# Patient Record
Sex: FEMALE | Race: WHITE
[De-identification: names, ages, dates, MRNs, and addresses within clinical notes are randomized per-mention and may not be internally consistent; named-entity substitution may affect disease eponyms.]

---

## 2017-04-12 ENCOUNTER — HOSPITAL ENCOUNTER (INPATIENT)
Dept: HOSPITAL 25 - ED | Age: 62
LOS: 3 days | Discharge: HOME HEALTH | DRG: 309 | End: 2017-04-15
Attending: INTERNAL MEDICINE | Admitting: HOSPITALIST
Payer: MEDICARE

## 2017-04-12 DIAGNOSIS — I24.8: ICD-10-CM

## 2017-04-12 DIAGNOSIS — F10.21: ICD-10-CM

## 2017-04-12 DIAGNOSIS — Z79.84: ICD-10-CM

## 2017-04-12 DIAGNOSIS — F41.9: ICD-10-CM

## 2017-04-12 DIAGNOSIS — Z88.8: ICD-10-CM

## 2017-04-12 DIAGNOSIS — E11.9: ICD-10-CM

## 2017-04-12 DIAGNOSIS — F40.01: ICD-10-CM

## 2017-04-12 DIAGNOSIS — E78.5: ICD-10-CM

## 2017-04-12 DIAGNOSIS — Z87.891: ICD-10-CM

## 2017-04-12 DIAGNOSIS — F31.9: ICD-10-CM

## 2017-04-12 DIAGNOSIS — K80.20: ICD-10-CM

## 2017-04-12 DIAGNOSIS — Z79.01: ICD-10-CM

## 2017-04-12 DIAGNOSIS — G89.4: ICD-10-CM

## 2017-04-12 DIAGNOSIS — Z82.49: ICD-10-CM

## 2017-04-12 DIAGNOSIS — I10: ICD-10-CM

## 2017-04-12 DIAGNOSIS — R07.9: ICD-10-CM

## 2017-04-12 DIAGNOSIS — I48.91: Primary | ICD-10-CM

## 2017-04-12 DIAGNOSIS — Z90.710: ICD-10-CM

## 2017-04-12 DIAGNOSIS — Z79.82: ICD-10-CM

## 2017-04-12 DIAGNOSIS — Z83.3: ICD-10-CM

## 2017-04-12 DIAGNOSIS — Z88.1: ICD-10-CM

## 2017-04-12 DIAGNOSIS — Z23: ICD-10-CM

## 2017-04-12 DIAGNOSIS — I25.10: ICD-10-CM

## 2017-04-12 DIAGNOSIS — Z81.8: ICD-10-CM

## 2017-04-12 DIAGNOSIS — Z95.5: ICD-10-CM

## 2017-04-12 DIAGNOSIS — E66.3: ICD-10-CM

## 2017-04-12 DIAGNOSIS — Z81.1: ICD-10-CM

## 2017-04-12 LAB
ALBUMIN SERPL BCG-MCNC: 4.2 G/DL (ref 3.2–5.2)
ALP SERPL-CCNC: 67 U/L (ref 34–104)
ALT SERPL W P-5'-P-CCNC: 14 U/L (ref 7–52)
ANION GAP SERPL CALC-SCNC: 9 MMOL/L (ref 2–11)
AST SERPL-CCNC: 19 U/L (ref 13–39)
BUN SERPL-MCNC: 10 MG/DL (ref 6–24)
BUN/CREAT SERPL: 11.6 (ref 8–20)
CALCIUM SERPL-MCNC: 9.3 MG/DL (ref 8.6–10.3)
CHLORIDE SERPL-SCNC: 105 MMOL/L (ref 101–111)
GLOBULIN SER CALC-MCNC: 2.9 G/DL (ref 2–4)
GLUCOSE SERPL-MCNC: 142 MG/DL (ref 70–100)
HCO3 SERPL-SCNC: 22 MMOL/L (ref 22–32)
HCT VFR BLD AUTO: 43 % (ref 35–47)
HGB BLD-MCNC: 14.7 G/DL (ref 12–16)
MAGNESIUM SERPL-MCNC: 2.1 MG/DL (ref 1.9–2.7)
MCH RBC QN AUTO: 28 PG (ref 27–31)
MCHC RBC AUTO-ENTMCNC: 34 G/DL (ref 31–36)
MCV RBC AUTO: 82 FL (ref 80–97)
POTASSIUM SERPL-SCNC: 3.9 MMOL/L (ref 3.5–5)
PROT SERPL-MCNC: 7.1 G/DL (ref 6.4–8.9)
RBC # BLD AUTO: 5.3 10^6/UL (ref 4–5.4)
SODIUM SERPL-SCNC: 136 MMOL/L (ref 133–145)
TROPONIN I SERPL-MCNC: 0.04 NG/ML (ref ?–0.04)
TSH SERPL-ACNC: 1.09 MCIU/ML (ref 0.34–5.6)
WBC # BLD AUTO: 10 10^3/UL (ref 3.5–10.8)

## 2017-04-12 PROCEDURE — 93005 ELECTROCARDIOGRAM TRACING: CPT

## 2017-04-12 PROCEDURE — 90686 IIV4 VACC NO PRSV 0.5 ML IM: CPT

## 2017-04-12 PROCEDURE — 80061 LIPID PANEL: CPT

## 2017-04-12 PROCEDURE — 80053 COMPREHEN METABOLIC PANEL: CPT

## 2017-04-12 PROCEDURE — 85730 THROMBOPLASTIN TIME PARTIAL: CPT

## 2017-04-12 PROCEDURE — 84484 ASSAY OF TROPONIN QUANT: CPT

## 2017-04-12 PROCEDURE — 85610 PROTHROMBIN TIME: CPT

## 2017-04-12 PROCEDURE — A9502 TC99M TETROFOSMIN: HCPCS

## 2017-04-12 PROCEDURE — 84443 ASSAY THYROID STIM HORMONE: CPT

## 2017-04-12 PROCEDURE — 85025 COMPLETE CBC W/AUTO DIFF WBC: CPT

## 2017-04-12 PROCEDURE — 71275 CT ANGIOGRAPHY CHEST: CPT

## 2017-04-12 PROCEDURE — 93306 TTE W/DOPPLER COMPLETE: CPT

## 2017-04-12 PROCEDURE — 78452 HT MUSCLE IMAGE SPECT MULT: CPT

## 2017-04-12 PROCEDURE — 36415 COLL VENOUS BLD VENIPUNCTURE: CPT

## 2017-04-12 PROCEDURE — 83036 HEMOGLOBIN GLYCOSYLATED A1C: CPT

## 2017-04-12 PROCEDURE — 71020: CPT

## 2017-04-12 PROCEDURE — 83735 ASSAY OF MAGNESIUM: CPT

## 2017-04-12 PROCEDURE — 93017 CV STRESS TEST TRACING ONLY: CPT

## 2017-04-12 PROCEDURE — 85379 FIBRIN DEGRADATION QUANT: CPT

## 2017-04-12 NOTE — RAD
Indication: Chest pain.



2 views of the chest including dual energy PA views demonstrate no mediastinal shift.

Heart is of normal size and configuration. When compared to previous exam of October 17, 2012 no significant change is noted.



IMPRESSION: No active cardiopulmonary disease is noted.

## 2017-04-12 NOTE — ED
rosemarie MENA Timothy, scribed for Niko Agrawal MD on 04/12/17 at 1936 .





Palpitations / Dysrhythmia





- HPI Summary


HPI Summary: 





Renata Bran is a 63 yo female presenting to [facility] with sudden-onset 

palpitations of 160-180 BPM, which were narrow and regular per EMS, and chest 

tightness since 1600 today. She was sitting in a chair when she noticed her Sx. 

She states her Sx were similar to 11/2012 when she had a coronary stent placed. 

She has a Hx of similar Sx, but states tonight they lasted much longer than 

normal. She states she is not taking any of her medications right now due to 

hermental health problems, and has not received her Rx's. Her MHx includes CAD, 

coronary stent 11/2012, HTN, DM, bipolar disorder, panic disorder, depression, 

anxiety, and substance abuse.








- History of Current Complaint


Time Seen by Provider: 04/12/17 19:19


Onset/Duration: Sudden Onset, Lasting Hours, Still Present


Timing: Constant


Severity Initially: Moderate


Severity Currently: Moderate


Character: Fast, Irregular


Aggravating: Rest


Associated Signs & Symptoms: Chest Pain - tightness





- Allergy/Home Medications


Allergies/Adverse Reactions: 


 Allergies











Allergy/AdvReac Type Severity Reaction Status Date / Time


 


Erythromycin Allergy Intermediate Nausea And Verified 03/30/15 13:46





   Vomiting  


 


Gabapentin Allergy Unknown Unknown Verified 04/12/17 19:54





   Reaction  





   Details  











Home Medications: 


 Home Medications





Hydroxyzine Pamoate 50 mg PO BID PRN 04/12/17 [History Confirmed 04/12/17]











PMH/Surg Hx/FS Hx/Imm Hx


Endocrine/Hematology History: Reports: Hx Diabetes


   Denies: Hx Anticoagulant Therapy, Hx Thyroid Disease


Cardiovascular History: Reports: Hx Hypertension - W/MEDS, Other Cardiovascular 

Problems/Disorders - PT.STATES HEART MURMUR BICUSPID CONDITION


   Denies: Hx Pacemaker/ICD


Respiratory History: 


   Denies: Hx Asthma, Hx Chronic Obstructive Pulmonary Disease (COPD)


 History: 


   Denies: Hx Renal Disease


Neurological History: 


   Denies: Hx Dementia, Hx Seizures


Psychiatric History: Reports: Hx Anxiety, Hx Depression, Hx Panic Disorder - BI 

POLAR, Hx Bipolar Disorder - hospitalized in 3/2010, Hx Substance Abuse


   Denies: Hx Eating Disorder





- Surgical History


Surgery Procedure, Year, and Place: CESARIAN 1981,1983; HYSTERECTOMY 2008; 

STENT LAST NOVEMBER,[facility]


Infectious Disease History: 


   Denies: Hx Hepatitis, Hx Human Immunodeficiency Virus (HIV), History Other 

Infectious Disease, Traveled Outside the [place] in Last 30 Days





- Family History


Known Family History: Positive: Other - depression, alcohol abuse





- Social History


Alcohol Use: None


Substance Use Type: Reports: None


Smoking Status (MU): Former Smoker





Review of Systems


Constitutional: Negative


Eyes: Negative


ENT: Negative


Positive: Palpitations, Chest Pain


Respiratory: Negative


Gastrointestinal: Negative


Genitourinary: Negative


Musculoskeletal: Negative


Skin: Negative


Neurological: Negative


Psychological: Normal


All Other Systems Reviewed And Are Negative: Yes





Physical Exam


Triage Information Reviewed: Yes


Vital Signs On Initial Exam: 


 Initial Vitals











Temp Pulse Resp BP Pulse Ox


 


 97.0 F   145   16   156/116   96 


 


 04/12/17 19:39  04/12/17 19:39  04/12/17 19:39  04/12/17 19:39  04/12/17 19:39











Vital Signs Reviewed: Yes


Appearance: Positive: No Pain Distress, Thin


Skin: Positive: Warm


Head/Face: Positive: Normal Head/Face Inspection


Eyes: Positive: GENE


ENT: Positive: Hearing grossly normal


Neck: Positive: Supple


Respiratory/Lung Sounds: Positive: Clear to Auscultation, Breath Sounds Present


Cardiovascular: Positive: IRR, Tachycardia


Abdomen Description: Positive: Nontender, Soft


Bowel Sounds: Positive: Present


Musculoskeletal: Positive: Strength/ROM Intact


Neurological: Positive: Sensory/Motor Intact, Alert, Oriented to Person Place, 

Time


Psychiatric: Positive: Anxious





Diagnostics





- Vital Signs


 Vital Signs











  Temp Pulse Resp BP Pulse Ox


 


 04/12/17 19:39  97.0 F  145  16  156/116  96














- Laboratory


Lab Results: 


 Lab Results











  04/12/17 04/12/17 04/12/17 Range/Units





  20:10 20:10 20:10 


 


WBC  10.0    (3.5-10.8)  10^3/ul


 


RBC  5.30    (4.0-5.4)  10^6/ul


 


Hgb  14.7    (12.0-16.0)  g/dl


 


Hct  43    (35-47)  %


 


MCV  82    (80-97)  fL


 


MCH  28    (27-31)  pg


 


MCHC  34    (31-36)  g/dl


 


RDW  13    (10.5-15)  %


 


Plt Count  183    (150-450)  10^3/ul


 


MPV  9    (7.4-10.4)  um3


 


Neut % (Auto)  64.5    (38-83)  %


 


Lymph % (Auto)  26.7    (25-47)  %


 


Mono % (Auto)  6.4    (1-9)  %


 


Eos % (Auto)  1.5    (0-6)  %


 


Baso % (Auto)  0.9    (0-2)  %


 


Absolute Neuts (auto)  6.5    (1.5-7.7)  10^3/ul


 


Absolute Lymphs (auto)  2.7    (1.0-4.8)  10^3/ul


 


Absolute Monos (auto)  0.6    (0-0.8)  10^3/ul


 


Absolute Eos (auto)  0.1    (0-0.6)  10^3/ul


 


Absolute Basos (auto)  0.1    (0-0.2)  10^3/ul


 


Absolute Nucleated RBC  0.02    10^3/ul


 


Nucleated RBC %  0.2    


 


INR (Anticoag Therapy)    0.78 L  (0.89-1.11)  


 


D-Dimer, Quantitative    260 H  (Less Than 230)  ng/mL


 


Sodium   136   (133-145)  mmol/L


 


Potassium   3.9   (3.5-5.0)  mmol/L


 


Chloride   105   (101-111)  mmol/L


 


Carbon Dioxide   22   (22-32)  mmol/L


 


Anion Gap   9   (2-11)  mmol/L


 


BUN   10   (6-24)  mg/dL


 


Creatinine   0.86   (0.51-0.95)  mg/dL


 


Est GFR ( Amer)   85.4   (>60)  


 


Est GFR (Non-Af Amer)   66.4   (>60)  


 


BUN/Creatinine Ratio   11.6   (8-20)  


 


Glucose   142 H   ()  mg/dL


 


Calcium   9.3   (8.6-10.3)  mg/dL


 


Magnesium   2.1   (1.9-2.7)  mg/dL


 


Total Bilirubin   0.40   (0.2-1.0)  mg/dL


 


AST   19   (13-39)  U/L


 


ALT   14   (7-52)  U/L


 


Alkaline Phosphatase   67   ()  U/L


 


Troponin I   0.04 H*   (<0.04)  ng/mL


 


Total Protein   7.1   (6.4-8.9)  g/dL


 


Albumin   4.2   (3.2-5.2)  g/dL


 


Globulin   2.9   (2-4)  g/dL


 


Albumin/Globulin Ratio   1.4   (1-3)  


 


TSH   1.09   (0.34-5.60)  mcIU/mL











Result Diagrams: 


 04/13/17 04:57





 04/12/17 20:10


Lab Statement: Any lab studies that have been ordered have been reviewed, and 

results considered in the medical decision making process.





- Radiology


  ** CXR


Xray Interpretation: No Acute Changes - IMPRESSION: No active cardiopulmonary 

disease is noted.


Radiology Interpretation Completed By: Radiologist





- CT


  ** CTA Chest


CT Interpretation: Positive (See Comments) - IMPRESSION: NO EVIDENCE OF 

PULMONARY EMBOLUS IS NOTED.  CHOLELITHIASIS WITHOUT BILIARY DUCT DILATATION.  

SMALL MEDIASTINAL LYMPH NODES AND RIGHT HILAR LYMPH NODES MEASURING UP TO 10 MM.


CT Interpretation Completed By: Radiologist





- EKG


  ** 1932


Cardiac Rate: Tachycardia - 133 BPM


EKG Interpretation: Afib @ 133 BPM, rapid ventricular response.





  ** 1958


Cardiac Rate: NL - 74 BPM


EKG Interpretation: Pt converted to NSR @ 74 BPM, normal EKG





Re-Evaluation





- Re-Evaluation


  ** First Eval


Re-Evaluation Time: 21:05


Change: Improved


Comment: Pt had questions regarding the course of Tx, she had her questions 

satisfactorily answered.





Course/Dx





- Course


Assessment/Plan: Renata Bran is a 63 yo female [facility] 

palpitations and chest tightness since 1600 today. She states she has not been 

taking any of her medications due to her mental health disorders. In the ED she 

was given Diltiazem IV to slow her pulse. Her first EKG displayed Afib at 133 

BPM with rapid ventricular response. Her second EKG showed a conversion to NSR 

at 74 BPM. Her CXR suggested no acute disease. Her CTA chest suggested no 

evidence of pulmonaru embolus; cholelithiasis without biliary dilation; and 

small mediastinal lymph nodes and right hilar lymph nodes measuring up to 10 

mm. After clinical examination and review of her lab and imaging studies, 

notably her troponin of 0.04 and her D-dimer of 260, as well as discussion with 

Dr. Frankenberg, she will be admitted to [facility] for further evaluation and 

treatment of new onset Afib and ACS.





- Diagnoses


Provider Diagnoses: 


 New onset a-fib, Acute coronary syndrome








- Physician Notifications


Discussed Care Of Patient With: 2227 - Dr. Frankenberg (hospitalist) - 

discussed Pt condition, recommends admission


Instructed by Provider To: Admit As Inpatient





- Critical Care Time


Critical Care Time: 30-74 min





Discharge





- Discharge Plan


Condition: Stable


Disposition: ADMITTED TO [facility]


Discharge Disposition Comment: Admitted for evaluation and treament of new 

onset Afib and ACS





The documentation as recorded by the rosemarie dumont Timothy accurately 

reflects the service I personally performed and the decisions made by me, 

Niko Agrawal MD.

## 2017-04-12 NOTE — RAD
Indication: Elevated d-dimer, atrial fibrillation.



Contrast: Administered 137.7 ml of VISAPAQUE 320 mgi/ml



CTA of the chest was performed after IV contrast administration. Coronal and sagittal

reconstructed images were obtained.



The pulmonary arterial tree is well opacified. There are no filling defects present to

suggest pulmonary embolus.



No evidence of aortic dissection is noted. Atherosclerotic aorta is noted.



Pretracheal lymph node is noted measuring up to 10 mm. Right hilar adenopathy measuring up

to 10 mm is noted. Heart demonstrates no pericardial effusion.



The trachea and major bronchi appear patent. No alveolar consolidation is noted. There is

poor inspiratory effort noted. No focal nodules identified. No pleural fluid is

identified.



The visualized abdominal organs are unremarkable.



Gallbladder demonstrates cholelithiasis.



IMPRESSION: NO EVIDENCE OF PULMONARY EMBOLUS IS NOTED.



CHOLELITHIASIS WITHOUT BILIARY DUCT DILATATION.



SMALL MEDIASTINAL LYMPH NODES AND RIGHT HILAR LYMPH NODES MEASURING UP TO 10 MM.

## 2017-04-13 LAB
CHOLEST SERPL-MCNC: 218 MG/DL
HCT VFR BLD AUTO: 39 % (ref 35–47)
HDLC SERPL-MCNC: 58.8 MG/DL
HGB BLD-MCNC: 13.4 G/DL (ref 12–16)
MCH RBC QN AUTO: 29 PG (ref 27–31)
MCHC RBC AUTO-ENTMCNC: 35 G/DL (ref 31–36)
MCV RBC AUTO: 82 FL (ref 80–97)
RBC # BLD AUTO: 4.71 10^6/UL (ref 4–5.4)
TRIGL SERPL-MCNC: 146 MG/DL
TROPONIN I SERPL-MCNC: 0.09 NG/ML (ref ?–0.04)
WBC # BLD AUTO: 9.7 10^3/UL (ref 3.5–10.8)

## 2017-04-13 PROCEDURE — 3E0234Z INTRODUCTION OF SERUM, TOXOID AND VACCINE INTO MUSCLE, PERCUTANEOUS APPROACH: ICD-10-PCS | Performed by: INTERNAL MEDICINE

## 2017-04-13 RX ADMIN — OMEPRAZOLE SCH MG: 20 CAPSULE, DELAYED RELEASE ORAL at 05:09

## 2017-04-13 RX ADMIN — INSULIN LISPRO SCH UNITS: 100 INJECTION, SOLUTION INTRAVENOUS; SUBCUTANEOUS at 20:54

## 2017-04-13 RX ADMIN — APIXABAN SCH MG: 5 TABLET, FILM COATED ORAL at 17:33

## 2017-04-13 RX ADMIN — INSULIN LISPRO SCH: 100 INJECTION, SOLUTION INTRAVENOUS; SUBCUTANEOUS at 08:47

## 2017-04-13 RX ADMIN — INSULIN LISPRO SCH: 100 INJECTION, SOLUTION INTRAVENOUS; SUBCUTANEOUS at 16:34

## 2017-04-13 RX ADMIN — INSULIN LISPRO SCH: 100 INJECTION, SOLUTION INTRAVENOUS; SUBCUTANEOUS at 13:10

## 2017-04-13 RX ADMIN — METOPROLOL TARTRATE SCH MG: 25 TABLET, FILM COATED ORAL at 09:22

## 2017-04-13 RX ADMIN — LAMOTRIGINE SCH MG: 100 TABLET ORAL at 09:22

## 2017-04-13 RX ADMIN — METFORMIN HYDROCHLORIDE SCH MG: 500 TABLET, FILM COATED ORAL at 20:19

## 2017-04-13 RX ADMIN — TRAZODONE HYDROCHLORIDE PRN MG: 50 TABLET ORAL at 02:58

## 2017-04-13 RX ADMIN — LAMOTRIGINE SCH MG: 100 TABLET ORAL at 20:20

## 2017-04-13 RX ADMIN — METFORMIN HYDROCHLORIDE SCH MG: 500 TABLET, FILM COATED ORAL at 09:22

## 2017-04-13 RX ADMIN — METOPROLOL TARTRATE SCH MG: 25 TABLET, FILM COATED ORAL at 20:20

## 2017-04-13 RX ADMIN — ASPIRIN 325 MG ORAL TABLET SCH MG: 325 PILL ORAL at 09:22

## 2017-04-13 RX ADMIN — AMLODIPINE BESYLATE SCH MG: 5 TABLET ORAL at 16:32

## 2017-04-13 NOTE — HP
H&P (Free Text)


History and Physical: 





PCP: ALHAJI Whitman MD





Date/Time of Evaluation: 04/12/2017 2230





CC: palpitations





HPI: Mrs Bran is a 61YO female presenting with sudden onset ~1600 of 

palpitations & mild substernal chest pressure radiating into her L shoulder and 

L neck. She denies SOB, N/V, light-headedness, & sweats. She has had similar 

episodes in the past lasting only a few seconds, but this one didn't stop 

eventually prompting her to present for evaluation at 1925. 





Work up reveals a ECG with AFIB rate 133, mild ST depressions in V4-6, I, & II. 

D-dimer is 240, troponin 0.04. CTA chest negative for PE, positive small 

lymphadenopathy. She was given 20mg IV diltiazem precipitating conversion to 

NSR. Request for observation made based on chest pressure & indeterminate 

troponin.





PMedHx


Allergies


Erythromycin Allergy (Intermediate, Verified 03/30/15 13:46)


 Nausea And Vomiting


Gabapentin Allergy (Unknown, Verified 04/12/17 19:54)


 Unknown Reaction Details





CAD/stent


DM2


HTN


HLD


bipolar


anxiety


depression





Ambulatory Orders


Nursing to reconcile.





Aspirin TAB* [Aspirin 325 MG TAB*] 325 mg PO DAILY 10/17/12 


Hydrochlorothiazide TAB* [Hydrodiuril TAB*] 25 mg PO DAILY 10/17/12 


Multiple Vitamin [Multivitamins] 1 tab PO DAILY 10/17/12 


lamoTRIgine TAB(*) [Lamictal TAB(*)] 200 mg PO BID 10/17/12 


metFORMIN* [Glucophage 500 MG TAB *] 1,000 mg PO BID 10/17/12 


traZODone TAB* [Desyrel*] 50 mg PO BEDTIME PRN 10/17/12 


Hydroxyzine Pamoate 50 mg PO BID PRN 04/12/17 





SocHx: former smoker quit in 1994 w/ >40PYHX (2-3PPD [id number]), former drinker 

quit in 2004, denies active recreational drug use; ; on disability, 

formerly worked as an administrative assistance at [employer]; full code status





FamHx: positive for DM2, HTN, HLD





ROS: as above, otherwise reviewed and all were negative





Constitutional: NAD, normally developed, overweight white female


 


vitals: 


 Vital Signs











Temp  36.1 C   04/12/17 19:39


 


Pulse  64   04/12/17 22:09


 


Resp  8   04/12/17 22:09


 


BP  169/88   04/12/17 21:32


 


Pulse Ox  97   04/12/17 22:09








 Intake & Output











 04/12/17 04/12/17 04/13/17





 11:59 23:59 11:59


 


Weight  68.039 kg 








HEENM: atraumatic; sclera/conjunctiva: non-icteric/clear; hearing: clinically 

intact; oropharynx: clear, mucosa moist





Neck: soft tissue: non-tender; thyroid: normal





Pulmonary: clear to auscultation bilaterally, good aeration, no accessory 

muscle use





CV: RR/RR, normal S1S2, no carotid bruit, no jugular venous distention, 2+ B DP/

PT, no edema





Abdominal: soft, non-distended, non-tender, no rebound/guarding/rigidity, 

normoactive bowel sounds, no hepatosplenomegaly or masses, no costovertebral 

angle tenderness





Musculoskeletal: general: grossly intact; gait: stable





Integumental: normal appearance and texture





Psychiatric 


orientation: AA&O to PPS


affect: calm


mood: cooperative


eye contact: good


content: reliable


responses: timely


insight: fair to good





Testing: 


 Lab Results











  04/12/17 04/12/17 04/12/17 Range/Units





  20:10 20:10 20:10 


 


WBC  10.0    (3.5-10.8)  10^3/ul


 


RBC  5.30    (4.0-5.4)  10^6/ul


 


Hgb  14.7    (12.0-16.0)  g/dl


 


Hct  43    (35-47)  %


 


MCV  82    (80-97)  fL


 


MCH  28    (27-31)  pg


 


MCHC  34    (31-36)  g/dl


 


RDW  13    (10.5-15)  %


 


Plt Count  183    (150-450)  10^3/ul


 


MPV  9    (7.4-10.4)  um3


 


Neut % (Auto)  64.5    (38-83)  %


 


Lymph % (Auto)  26.7    (25-47)  %


 


Mono % (Auto)  6.4    (1-9)  %


 


Eos % (Auto)  1.5    (0-6)  %


 


Baso % (Auto)  0.9    (0-2)  %


 


Absolute Neuts (auto)  6.5    (1.5-7.7)  10^3/ul


 


Absolute Lymphs (auto)  2.7    (1.0-4.8)  10^3/ul


 


Absolute Monos (auto)  0.6    (0-0.8)  10^3/ul


 


Absolute Eos (auto)  0.1    (0-0.6)  10^3/ul


 


Absolute Basos (auto)  0.1    (0-0.2)  10^3/ul


 


Absolute Nucleated RBC  0.02    10^3/ul


 


Nucleated RBC %  0.2    


 


INR (Anticoag Therapy)    0.78 L  (0.89-1.11)  


 


D-Dimer, Quantitative    260 H  (Less Than 230)  ng/mL


 


Sodium   136   (133-145)  mmol/L


 


Potassium   3.9   (3.5-5.0)  mmol/L


 


Chloride   105   (101-111)  mmol/L


 


Carbon Dioxide   22   (22-32)  mmol/L


 


Anion Gap   9   (2-11)  mmol/L


 


BUN   10   (6-24)  mg/dL


 


Creatinine   0.86   (0.51-0.95)  mg/dL


 


Est GFR ( Amer)   85.4   (>60)  


 


Est GFR (Non-Af Amer)   66.4   (>60)  


 


BUN/Creatinine Ratio   11.6   (8-20)  


 


Glucose   142 H   ()  mg/dL


 


Calcium   9.3   (8.6-10.3)  mg/dL


 


Magnesium   2.1   (1.9-2.7)  mg/dL


 


Total Bilirubin   0.40   (0.2-1.0)  mg/dL


 


AST   19   (13-39)  U/L


 


ALT   14   (7-52)  U/L


 


Alkaline Phosphatase   67   ()  U/L


 


Troponin I   0.04 H*   (<0.04)  ng/mL


 


Total Protein   7.1   (6.4-8.9)  g/dL


 


Albumin   4.2   (3.2-5.2)  g/dL


 


Globulin   2.9   (2-4)  g/dL


 


Albumin/Globulin Ratio   1.4   (1-3)  


 


TSH   1.09   (0.34-5.60)  mcIU/mL








ECG, personally reviewed: AFIB rate 133, mild ST depressions in V4-6, I, & II





CXR, personally reviewed: IMPRESSION: No active cardiopulmonary disease is 

noted.





CTA chest, personally reviewed: IMPRESSION: NO EVIDENCE OF PULMONARY EMBOLUS IS 

NOTED. CHOLELITHIASIS WITHOUT BILIARY DUCT DILATATION. SMALL MEDIASTINAL LYMPH 

NODES AND RIGHT HILAR LYMPH NODES MEASURING UP TO 10 MM.





Impression: 62F presenting with new diagnosis of AFIB and history of episodic 

palpitations





DIAGNOSIS & PLAN


Primary 


new diagnosis of AFIB/RVR


: currently converted to NSR


: 25mg metoprolol PO BID


: telemetry


: heparin GTT


: [id number] score: 4





elevated troponin w/ HX CAD


: possibly demand ischemia, but given her chest discomfort is concerning


: aspirin


: beta blocker as above


: trend


: consider stress test once further stabilized





Secondary 


DM2


: correctional protocol, continue metformin


: check A1c





HTN


: D/C HCTZ


: start metoprolol as above





HLD


: heart healthy diet





bipolar


: continue lamotrigine





Admission Rational: observation for AFIB/RVR & r/o ACS


DVTp: heparin GTT


Code Status: full

## 2017-04-13 NOTE — PN
Subjective


Date of Service: 04/13/17


Interval History: 





Pt is feeling well. No further chest pain. No SOB. No palpitations. 





Objective


Active Medications: 








Acetaminophen (Tylenol Tab*)  650 mg PO Q6H PRN


   PRN Reason: FEVER/PAIN


Albuterol (Ventolin 2.5 Mg/3 Ml Neb.Sol*)  2.5 mg INH Q2H PRN


   PRN Reason: SOB/WHEEZING


Apixaban (Eliquis*)  5 mg PO BID [facility]


Aspirin (Aspirin Tab*)  325 mg PO DAILY [facility]


   Last Admin: 04/13/17 09:22 Dose:  325 mg


Insulin Human Lispro (Humalog*)  0 units SUBCUT ACHS [facility]


   PRN Reason: Protocol


   Last Admin: 04/13/17 08:47 Dose:  Not Given


Lamotrigine (Lamictal Tab(*))  200 mg PO BID [facility]


   Last Admin: 04/13/17 09:22 Dose:  50 mg


Melatonin (Melatonin (Nf))  3 mg PO BEDTIME PRN; Protocol


   PRN Reason: Sleep


   Last Admin: 04/13/17 03:29 Dose:  3 mg


Metformin HCl (Glucophage*)  1,000 mg PO BID [facility]


   Last Admin: 04/13/17 09:22 Dose:  1,000 mg


Metoprolol Tartrate (Lopressor Tab*)  25 mg PO BID [facility]


   Last Admin: 04/13/17 09:22 Dose:  25 mg


Omeprazole (Prilosec Cap*)  20 mg PO DAILY@0600 [facility]


   Last Admin: 04/13/17 05:09 Dose:  20 mg


Ondansetron HCl (Zofran Inj*)  4 mg IV Q6H PRN


   PRN Reason: NAUSEA


Tramadol HCl (Ultram*)  50 mg PO Q6H PRN


   PRN Reason: PAIN


Trazodone HCl (Desyrel Tab*)  50 mg PO BEDTIME PRN


   PRN Reason: SLEEP


   Last Admin: 04/13/17 02:58 Dose:  50 mg








 Vital Signs











  04/12/17 04/12/17 04/13/17





  22:48 23:00 00:00


 


Temperature   


 


Pulse Rate 69 73 74


 


Respiratory 18 18 18





Rate   


 


Blood Pressure   





(mmHg)   


 


O2 Sat by Pulse 98 95 96





Oximetry   














  04/13/17 04/13/17 04/13/17





  00:19 00:23 01:00


 


Temperature   


 


Pulse Rate 71 73 69


 


Respiratory 16 20 22





Rate   


 


Blood Pressure 158/140 187/83 





(mmHg)   


 


O2 Sat by Pulse 97 96 95





Oximetry   














  04/13/17 04/13/17 04/13/17





  01:30 02:20 04:51


 


Temperature  97.7 F 97.6 F


 


Pulse Rate 67 68 56


 


Respiratory 17 18 18





Rate   


 


Blood Pressure 58/31 230/70 150/77





(mmHg)   


 


O2 Sat by Pulse 96 98 96





Oximetry   














  04/13/17 04/13/17





  07:28 09:23


 


Temperature  


 


Pulse Rate 55 75


 


Respiratory  14





Rate  


 


Blood Pressure 129/59 





(mmHg)  


 


O2 Sat by Pulse 97 98





Oximetry  











Oxygen Devices in Use Now: None


Appearance: Middle aged female sitting up in bed, NAD


Eyes: No Scleral Icterus


Ears/Nose/Mouth/Throat: Mucous Membranes Moist


Respiratory: Symmetrical Chest Expansion and Respiratory Effort, Clear to 

Auscultation


Cardiovascular: NL Sounds; No Murmurs; No JVD, RRR, No Edema


Abdominal: NL Sounds; No Tenderness; No Distention


Extremities: No Clubbing, Cyanosis


Skin: No Rash or Ulcers, No Nodules or Sclerosis


Neurological: Alert and Oriented x 3


Result Diagrams: 


 04/13/17 04:57





 04/12/17 20:10


Additional Lab and Data: 


 Lab Results











  04/12/17 04/12/17 04/12/17 Range/Units





  20:10 20:10 20:10 


 


WBC  10.0    (3.5-10.8)  10^3/ul


 


RBC  5.30    (4.0-5.4)  10^6/ul


 


Hgb  14.7    (12.0-16.0)  g/dl


 


Hct  43    (35-47)  %


 


MCV  82    (80-97)  fL


 


MCH  28    (27-31)  pg


 


MCHC  34    (31-36)  g/dl


 


RDW  13    (10.5-15)  %


 


Plt Count  183    (150-450)  10^3/ul


 


MPV  9    (7.4-10.4)  um3


 


Neut % (Auto)  64.5    (38-83)  %


 


Lymph % (Auto)  26.7    (25-47)  %


 


Mono % (Auto)  6.4    (1-9)  %


 


Eos % (Auto)  1.5    (0-6)  %


 


Baso % (Auto)  0.9    (0-2)  %


 


Absolute Neuts (auto)  6.5    (1.5-7.7)  10^3/ul


 


Absolute Lymphs (auto)  2.7    (1.0-4.8)  10^3/ul


 


Absolute Monos (auto)  0.6    (0-0.8)  10^3/ul


 


Absolute Eos (auto)  0.1    (0-0.6)  10^3/ul


 


Absolute Basos (auto)  0.1    (0-0.2)  10^3/ul


 


Absolute Nucleated RBC  0.02    10^3/ul


 


Nucleated RBC %  0.2    


 


INR (Anticoag Therapy)    0.78 L  (0.89-1.11)  


 


D-Dimer, Quantitative    260 H  (Less Than 230)  ng/mL


 


Sodium   136   (133-145)  mmol/L


 


Potassium   3.9   (3.5-5.0)  mmol/L


 


Chloride   105   (101-111)  mmol/L


 


Carbon Dioxide   22   (22-32)  mmol/L


 


Anion Gap   9   (2-11)  mmol/L


 


BUN   10   (6-24)  mg/dL


 


Creatinine   0.86   (0.51-0.95)  mg/dL


 


Est GFR ( Amer)   85.4   (>60)  


 


Est GFR (Non-Af Amer)   66.4   (>60)  


 


BUN/Creatinine Ratio   11.6   (8-20)  


 


Glucose   142 H   ()  mg/dL


 


Calcium   9.3   (8.6-10.3)  mg/dL


 


Magnesium   2.1   (1.9-2.7)  mg/dL


 


Total Bilirubin   0.40   (0.2-1.0)  mg/dL


 


AST   19   (13-39)  U/L


 


ALT   14   (7-52)  U/L


 


Alkaline Phosphatase   67   ()  U/L


 


Troponin I   0.04 H*   (<0.04)  ng/mL


 


Total Protein   7.1   (6.4-8.9)  g/dL


 


Albumin   4.2   (3.2-5.2)  g/dL


 


Globulin   2.9   (2-4)  g/dL


 


Albumin/Globulin Ratio   1.4   (1-3)  


 


TSH   1.09   (0.34-5.60)  mcIU/mL














Assess/Plan/Problems-Billing


Ms Bran is a 63 yo F who has a h/o HTN, Type II DM, CAD, HLD and anxiety/

depression who presented to the ER for c/o palpitations and chest discomfort. 





- Patient Problems


(1) Atrial fibrillation with RVR


Current Visit: Yes   Status: Acute   Code(s): I48.91 - UNSPECIFIED ATRIAL 

FIBRILLATION   SNOMED Code(s): 063815746975953


   Comment: The patient converted to NSR spontaneously. Will plan on getting an 

echo this afternoon to eval for valvular dysfunction. She will remain on 

metoprolol 25mg BID. We discussed that her CHADS2 Vasc score is elevated at 3 

and that she would benefit from anticoagulation. We discussed the risks and 

benefits of coumadin vs the NOAC and she has decided that she will start 

eliquis. Will stop the heparin drip now and start eliquis tonight.    





(2) Chest pain


Current Visit: Yes   Status: Acute   Code(s): R07.9 - CHEST PAIN, UNSPECIFIED   

SNOMED Code(s): 55187119


   Comment: Likely related to her rapid afib however she has a h/o CAD and past 

stent placement therefore will plan on getting stress test. Her troponin was 

elevated on admission and peaked at 0.09. Likely related to demand ischemia 

however with her h/o CAD I feel a stress test is important.    





(3) CAD (coronary artery disease)


Current Visit: Yes   Status: Acute   Code(s): I25.10 - ATHSCL HEART DISEASE OF 

NATIVE CORONARY ARTERY W/O ANG PCTRS   SNOMED Code(s): 98824275


   Comment: Continue ASA and metoprolol.    





(4) HTN (hypertension)


Current Visit: Yes   Status: Acute   Code(s): I10 - ESSENTIAL (PRIMARY) 

HYPERTENSION   SNOMED Code(s): 72376735


   Comment: BP has fluctuated quite a bit. Continue to monitor on metoprolol 

25mg BID. If her pressures remain elevated will need to adjust her regimen.    





(5) Type II diabetes mellitus


Current Visit: Yes   Status: Acute   Comment: Sugars are under good control. 

Continue metformin and lispro sliding scale. Will check A1c.    





(6) HLD (hyperlipidemia)


Current Visit: Yes   Status: Acute   Code(s): E78.5 - HYPERLIPIDEMIA, 

UNSPECIFIED   SNOMED Code(s): 00776480


   Comment: Check lipid profile.    





(7) Anxiety and depression


Current Visit: Yes   Status: Acute   Code(s): F41.9 - ANXIETY DISORDER, 

UNSPECIFIED; F32.9 - MAJOR DEPRESSIVE DISORDER, SINGLE EPISODE, UNSPECIFIED   

SNOMED Code(s): 710569653


   Comment: Continue trazodone.    





(8) DVT prophylaxis


Current Visit: Yes   Status: Acute   Code(s): [id number] -    SNOMED Code(s): 

283079316


   Comment: heparin drip-changing to eliquis.    





(9) Full code status


Current Visit: Yes   Status: Acute   Code(s): Z78.9 - OTHER SPECIFIED HEALTH 

STATUS   SNOMED Code(s): 362049072

## 2017-04-13 NOTE — ECHO
Patient:      JOHNATHAN MARTÍNEZ 

Med Rec#:     [id number]            :          1955          

Date:         2017            Age:          62y                 

Account#:     [id number]          Height:       152.4 cm / 60.0 in

Accession#:   [id number]           Weight:       60.9 kg / 134.2 lbs

Sex:          F                     BSA:          1.58

Room#:        432                   

Admit Date#:  2017          

Type:         Inpatient

 

Referring:    Rayna Leiva DO

Reading:      Pal Correa MD

Sonographer:  Teresa Banegas RN RDCS

CC:           Marga Whitman MD

______________________________________________________________________

 

Transthoracic Echocardiogram

 

Indication:

Atrial fibrillation, chest pressure

BP:           129/59

HR:           63

Rhythm:       NSR

 

Findings     

History:

CAD, PCI, DM, HTN, HLD, former smoker, aortic stenosis 

 

Technical Comments:

The study is technically limited due to the patient's smoking history.

Completed at 1555. 

 

Left Ventricle:

The left ventricular chamber size is decreased. Mild to moderate

concentric left ventricular hypertrophy is observed. There is increased

basal septal hypertrophy noted without evidence of an increased gradient

across the left ventricular outflow tract.  Global left ventricular wall

motion and contractility are within normal limits. There is normal left

ventricular systolic function. The estimated ejection fraction is

60-65%.  Normal left ventricular diastolic filling is observed. 

 

Left Atrium:

The left atrium is mildly dilated. 

 

Right Ventricle:

The right ventricular cavity size is normal. The right ventricular

global systolic function is normal. 

 

Right Atrium:

The right atrium is mildly dilated.  

 

Aortic Valve:

There is severe thickening of the right coronary cusp.The other leaflets

are mildly thickened and appears to move. Systolic excursion of the

right coronary cusp is reduced. There is no evidence of aortic

regurgitation. There is moderate aortic stenosis.By mean gradient and 2D

imaging the valve seems more mild to moderately stenotic. The mean

gradient of the aortic valve is 10 mmHg.  The peak instantaneous

gradient of the aortic valve is 16 mmHg.  The aortic valve area, by peak

velocities, is calculated at 1.2 cm2.  The aortic valve area, by VTI's,

is calculated at 1.2 cm2.  

 

Mitral Valve:

The mitral valve leaflets are mildly thickened. There is trace to mild

mitral regurgitation. There is no evidence of mitral stenosis. 

 

Tricuspid Valve:

The tricuspid valve leaflets are normal.  There is trace tricuspid

regurgitation.  No pulmonary hypertension is noted. There is no

tricuspid stenosis. 

 

Pulmonic Valve:

The pulmonic valve structure is not well visualized. There is no

evidence of pulmonic regurgitation. There is no pulmonic stenosis.  

 

Pericardium:

There is no significant pericardial effusion. A pericardial fat pad is

visualized. 

 

Aorta:

There is no dilatation of the ascending aorta. There is no dilatation of

the aortic arch. The aortic root is normal in size. 

 

Pulmonary Artery:

The main pulmonary artery is not well visualized. 

 

Venous:

The inferior vena cava appears normal in size. There is a greater than

50% respiratory change in the inferior vena cava dimension. 

 

Conclusions

Mild to moderate concentric left ventricular hypertrophy is observed.

There is normal left ventricular systolic function.

The estimated ejection fraction is 60-65%. 

Normal left ventricular diastolic filling is observed.

The morphology of the aortic valve cannot be definitively identified by

this study although several views suggests a tricuspid valve with a

fixed right coronay cusp.

There is moderate aortic stenosis by continuity equation but by mean

gradient and 2D imaging the valve seems more mild to moderately

stenotic.

There is trace to mild mitral regurgitation.

There is trace tricuspid regurgitation. 

Compared to report of study from 2011 the degree of aortic stenosis

seems less by 2D and mean gradient. The overall LV systolic function

seems better (was 55-60%). 

 

Measurements     

Name                    Value         Normal Range            

RVDdMajor (2D)          3.5 cm        (2.2 - 4.4)             

RAd ISD 4CH             5.2 cm        (3.4 - 4.9)             

RA (A4C)W               3.2 cm        (2.9 - 4.6)             

IVSd (2D)               1.7 cm        (0.6 - 1)               

LVPWd (2D)              1.2 cm        (0.6 - 1)               

IVS:LVPW ratio (2D)     1.37 ratio    -                        

LVIDd (2D)              3.1 cm        (3.6 - 5.4)             

LVIDs (2D)              2.2 cm        -                        

LV FS (2D)              30 %          (25 - 45)               

Aortic Annulus          2 cm          (1.4 - 2.6)             

Ao root diameter (2D)   2.6 cm        (2.1 - 3.5)             

Ascending Ao            3.2 cm        (2.1 - 3.4)             

Aortic arch             2.5 cm        (1.8 - 3.4)             

LA dimension (AP) 2D    4 cm          (2.3 - 3.8)             

LAd ISD 4CH             5.4 cm        (2.9 - 5.3)             

LA ISD 4CH W            4 cm          (2.5 - 4.5)             

 

Name                    Value         Normal Range            

LA ESV SP 4CH (A/L)     64 ml         -                        

LA ESV SP 2CH (A/L)     51 ml         -                        

LA ESV BP (A/L)         59 ml         -                        

LA ESV BP (A/L) index   38 ml/m2      -                        

LA ESV SP 4CH (MOD)     62 ml         -                        

LA ESV SP 2CH (MOD)     50 ml         -                        

LV mass (2D)            154.49 g      -                        

LV mass (2D) index      97.78 g/m2    -                        

 

Name                    Value         Normal Range            

MV E-wave Vmax          1.2 m/sec     -                        

MV deceleration time    178 msec      -                        

MV A-wave Vmax          0.53 m/sec    -                        

MV E:A ratio            2.2 ratio     -                        

LV septal e' Vmax       0.08 m/sec    -                        

LV lateral e' Vmax      0.08 m/sec    -                        

LV E:e' septal ratio    15 ratio      -                        

LV E:e' lateral ratio   15 ratio      -                        

 

Name                    Value         Normal Range            

AV Vmax                 2 m/sec       -                        

AV VTI                  48.4 cm       -                        

AV peak gradient        16 mmHg       -                        

AV mean gradient        10 mmHg       -                        

LVOT diameter           2 cm          -                        

LVOT Vmax               0.74 m/sec    -                        

LVOT VTI                18.4 cm       -                        

LVOT peak gradient      2 mmHg        -                        

LVOT mean gradient      1 mmHg        -                        

DOI (VTI)               0.38 ratio    -                        

DOI (Vmax)              0.37 ratio    -                        

SV LVOT                 58 ml         -                        

CHAYITO (continuity Vmax)   1.2 cm2       -                        

CHAYITO (continuity VTI)    1.2 cm2       -                        

PRATIK Vmax                0.86 m/sec    -                        

 

Name                    Value         Normal Range            

TR Vmax                 2.1 m/sec     -                        

TR peak gradient        18 mmHg       -                        

RAP                     3 mmHg        -                        

RVSP                    21 mmHg       -                        

IVC diameter            1.4 cm        -                        

 

Name                    Value         Normal Range            

PV Vmax                 0.64 m/sec    -                        

 

Electronically signed by: Pal Correa MD on 2017 16:13:53

## 2017-04-14 RX ADMIN — INSULIN LISPRO SCH: 100 INJECTION, SOLUTION INTRAVENOUS; SUBCUTANEOUS at 12:26

## 2017-04-14 RX ADMIN — METOPROLOL TARTRATE SCH: 25 TABLET, FILM COATED ORAL at 08:07

## 2017-04-14 RX ADMIN — INSULIN LISPRO SCH: 100 INJECTION, SOLUTION INTRAVENOUS; SUBCUTANEOUS at 08:05

## 2017-04-14 RX ADMIN — INSULIN LISPRO SCH: 100 INJECTION, SOLUTION INTRAVENOUS; SUBCUTANEOUS at 17:05

## 2017-04-14 RX ADMIN — ASPIRIN 325 MG ORAL TABLET SCH MG: 325 PILL ORAL at 08:11

## 2017-04-14 RX ADMIN — APIXABAN SCH MG: 5 TABLET, FILM COATED ORAL at 08:11

## 2017-04-14 RX ADMIN — METFORMIN HYDROCHLORIDE SCH MG: 500 TABLET, FILM COATED ORAL at 21:06

## 2017-04-14 RX ADMIN — OMEPRAZOLE SCH MG: 20 CAPSULE, DELAYED RELEASE ORAL at 05:21

## 2017-04-14 RX ADMIN — TRAZODONE HYDROCHLORIDE PRN MG: 50 TABLET ORAL at 00:01

## 2017-04-14 RX ADMIN — LAMOTRIGINE SCH MG: 100 TABLET ORAL at 21:05

## 2017-04-14 RX ADMIN — METOPROLOL TARTRATE SCH MG: 25 TABLET, FILM COATED ORAL at 21:05

## 2017-04-14 RX ADMIN — METFORMIN HYDROCHLORIDE SCH MG: 500 TABLET, FILM COATED ORAL at 12:46

## 2017-04-14 RX ADMIN — AMLODIPINE BESYLATE SCH MG: 5 TABLET ORAL at 08:12

## 2017-04-14 RX ADMIN — INSULIN LISPRO SCH: 100 INJECTION, SOLUTION INTRAVENOUS; SUBCUTANEOUS at 20:57

## 2017-04-14 RX ADMIN — LAMOTRIGINE SCH MG: 100 TABLET ORAL at 08:11

## 2017-04-14 RX ADMIN — APIXABAN SCH MG: 5 TABLET, FILM COATED ORAL at 18:14

## 2017-04-14 RX ADMIN — METOPROLOL TARTRATE SCH MG: 25 TABLET, FILM COATED ORAL at 12:46

## 2017-04-14 NOTE — RAD
Edited for charges.



INDICATION: Chest pain    



COMPARISON: None

 

TECHNIQUE: A single day SPECT protocol was utilized. Rest images were acquired 
following

the intravenous injection of 10.3 millicuries of technetium 99m tetrofosmin. 
Pharmacologic

stress images were acquired following the intravenous administration of 24.9 
millicuries

of technetium 99m tetrofosmin.



FINDINGS: 



There are no defects of the stress-induced or fixed nature. 



The cardiac chamber size is normal. There are no wall motion abnormalities. The 
ejection

fraction is calculated at 73 percent during stress.



IMPRESSION:  NO FIXED OR STRESS-INDUCED DEFECTS. 



ASSESSMENT:  LOW-RISK



Based on imaging criteria from ACC/AHA 2002 Guideline Update for the Management 
of

Patients With Chronic Stable Angina Table 23. Noninvasive Risk Stratification.



MTDD

## 2017-04-14 NOTE — PN
Subjective


Date of Service: 04/14/17


Interval History: 





Pt is feeling well. No chest pain or SOB. We discussed her home situation and 

the fact that she is afraid of going out of her house and that her house is not 

clean (by report there are buckets of stool, buckets of dirty dishes, no 

running water), she agrees to a psych consult today. 





Objective


Active Medications: 








Acetaminophen (Tylenol Tab*)  650 mg PO Q6H PRN


   PRN Reason: FEVER/PAIN


Albuterol (Ventolin 2.5 Mg/3 Ml Neb.Sol*)  2.5 mg INH Q2H PRN


   PRN Reason: SOB/WHEEZING


Amlodipine Besylate (Norvasc Tab*)  5 mg PO DAILY [facility]


   Last Admin: 04/14/17 08:12 Dose:  5 mg


Apixaban (Eliquis*)  5 mg PO 0900,1800 [facility]


   Last Admin: 04/14/17 08:11 Dose:  5 mg


Aspirin (Aspirin Tab*)  325 mg PO DAILY [facility]


   Last Admin: 04/14/17 08:11 Dose:  325 mg


Insulin Human Lispro (Humalog*)  0 units SUBCUT ACHS [facility]


   PRN Reason: Protocol


   Last Admin: 04/14/17 12:26 Dose:  Not Given


Lamotrigine (Lamictal Tab(*))  200 mg PO BID [facility]


   Last Admin: 04/14/17 08:11 Dose:  50 mg


Melatonin (Melatonin (Nf))  3 mg PO BEDTIME PRN; Protocol


   PRN Reason: Sleep


   Last Admin: 04/13/17 03:29 Dose:  3 mg


Metformin HCl (Glucophage*)  1,000 mg PO BID [facility]


   Last Admin: 04/14/17 12:46 Dose:  1,000 mg


Metoprolol Tartrate (Lopressor Tab*)  25 mg PO BID [facility]


   Last Admin: 04/14/17 08:07 Dose:  Not Given


Omeprazole (Prilosec Cap*)  20 mg PO DAILY@0600 [facility]


   Last Admin: 04/14/17 05:21 Dose:  20 mg


Ondansetron HCl (Zofran Inj*)  4 mg IV Q6H PRN


   PRN Reason: NAUSEA


Tramadol HCl (Ultram*)  50 mg PO Q6H PRN


   PRN Reason: PAIN


Trazodone HCl (Desyrel Tab*)  50 mg PO BEDTIME PRN


   PRN Reason: SLEEP


   Last Admin: 04/14/17 00:01 Dose:  50 mg








 Vital Signs











  04/13/17 04/13/17 04/13/17





  15:16 15:28 15:39


 


Temperature  99.3 F 


 


Pulse Rate 65 62 


 


Respiratory 20 20 





Rate   


 


Blood Pressure 221/73 221/73 184/80





(mmHg)   


 


O2 Sat by Pulse 94 98 





Oximetry   














  04/13/17 04/13/17 04/13/17





  19:50 20:00 23:21


 


Temperature 98.5 F  98.2 F


 


Pulse Rate 68  67


 


Respiratory 16 16 20





Rate   


 


Blood Pressure 185/67  143/60





(mmHg)   


 


O2 Sat by Pulse 96  99





Oximetry   














  04/14/17 04/14/17 04/14/17





  03:29 07:17 08:00


 


Temperature 98.1 F 97.9 F 


 


Pulse Rate 72 59 


 


Respiratory 16 16 16





Rate   


 


Blood Pressure 147/100 130/54 





(mmHg)   


 


O2 Sat by Pulse 97 96 99





Oximetry   











Oxygen Devices in Use Now: None


Appearance: Middle aged female sitting up in bed, NAD


Eyes: No Scleral Icterus


Ears/Nose/Mouth/Throat: Mucous Membranes Moist


Respiratory: Symmetrical Chest Expansion and Respiratory Effort, Clear to 

Auscultation


Cardiovascular: NL Sounds; No Murmurs; No JVD, RRR, No Edema


Abdominal: NL Sounds; No Tenderness; No Distention


Extremities: No Clubbing, Cyanosis


Skin: No Rash or Ulcers, No Nodules or Sclerosis


Neurological: Alert and Oriented x 3


Result Diagrams: 


 04/13/17 04:57





 04/12/17 20:10


Additional Lab and Data: 


 Lab Results











  04/12/17 04/12/17 04/12/17 Range/Units





  20:10 20:10 20:10 


 


WBC  10.0    (3.5-10.8)  10^3/ul


 


RBC  5.30    (4.0-5.4)  10^6/ul


 


Hgb  14.7    (12.0-16.0)  g/dl


 


Hct  43    (35-47)  %


 


MCV  82    (80-97)  fL


 


MCH  28    (27-31)  pg


 


MCHC  34    (31-36)  g/dl


 


RDW  13    (10.5-15)  %


 


Plt Count  183    (150-450)  10^3/ul


 


MPV  9    (7.4-10.4)  um3


 


Neut % (Auto)  64.5    (38-83)  %


 


Lymph % (Auto)  26.7    (25-47)  %


 


Mono % (Auto)  6.4    (1-9)  %


 


Eos % (Auto)  1.5    (0-6)  %


 


Baso % (Auto)  0.9    (0-2)  %


 


Absolute Neuts (auto)  6.5    (1.5-7.7)  10^3/ul


 


Absolute Lymphs (auto)  2.7    (1.0-4.8)  10^3/ul


 


Absolute Monos (auto)  0.6    (0-0.8)  10^3/ul


 


Absolute Eos (auto)  0.1    (0-0.6)  10^3/ul


 


Absolute Basos (auto)  0.1    (0-0.2)  10^3/ul


 


Absolute Nucleated RBC  0.02    10^3/ul


 


Nucleated RBC %  0.2    


 


INR (Anticoag Therapy)    0.78 L  (0.89-1.11)  


 


D-Dimer, Quantitative    260 H  (Less Than 230)  ng/mL


 


Sodium   136   (133-145)  mmol/L


 


Potassium   3.9   (3.5-5.0)  mmol/L


 


Chloride   105   (101-111)  mmol/L


 


Carbon Dioxide   22   (22-32)  mmol/L


 


Anion Gap   9   (2-11)  mmol/L


 


BUN   10   (6-24)  mg/dL


 


Creatinine   0.86   (0.51-0.95)  mg/dL


 


Est GFR ( Amer)   85.4   (>60)  


 


Est GFR (Non-Af Amer)   66.4   (>60)  


 


BUN/Creatinine Ratio   11.6   (8-20)  


 


Glucose   142 H   ()  mg/dL


 


Calcium   9.3   (8.6-10.3)  mg/dL


 


Magnesium   2.1   (1.9-2.7)  mg/dL


 


Total Bilirubin   0.40   (0.2-1.0)  mg/dL


 


AST   19   (13-39)  U/L


 


ALT   14   (7-52)  U/L


 


Alkaline Phosphatase   67   ()  U/L


 


Troponin I   0.04 H*   (<0.04)  ng/mL


 


Total Protein   7.1   (6.4-8.9)  g/dL


 


Albumin   4.2   (3.2-5.2)  g/dL


 


Globulin   2.9   (2-4)  g/dL


 


Albumin/Globulin Ratio   1.4   (1-3)  


 


TSH   1.09   (0.34-5.60)  mcIU/mL














Assess/Plan/Problems-Billing


Ms Bran is a 61 yo F who has a h/o HTN, Type II DM, CAD, HLD and anxiety/

depression who presented to the ER for c/o palpitations and chest discomfort. 





- Patient Problems


(1) Atrial fibrillation with RVR


Current Visit: Yes   Status: Acute   Code(s): I48.91 - UNSPECIFIED ATRIAL 

FIBRILLATION   SNOMED Code(s): 403971495697919


   Comment: The patient converted to NSR spontaneously. Echo shows a normal EF 

and wall motion, no significant valvular disease. Continue metoprolol 25mg BID 

and eliquis 5mg BID.    





(2) Chest pain


Current Visit: Yes   Status: Acute   Code(s): R07.9 - CHEST PAIN, UNSPECIFIED   

SNOMED Code(s): 34525037


   Comment: Stress test negative for ischemia or infarct.    





(3) CAD (coronary artery disease)


Current Visit: Yes   Status: Acute   Code(s): I25.10 - ATHSCL HEART DISEASE OF 

NATIVE CORONARY ARTERY W/O ANG PCTRS   SNOMED Code(s): 46734356


   Comment: Continue ASA and metoprolol.    





(4) HTN (hypertension)


Current Visit: Yes   Status: Acute   Code(s): I10 - ESSENTIAL (PRIMARY) 

HYPERTENSION   SNOMED Code(s): 72936644


   Comment: BP remains uncontrolled-at times 180-200 systolic. Will increase 

amlodipine to 10mg daily and monitor her BP. BP is worse at night. Monitor 

overnight tonight after the addition of amlodipine to ensure her pressures are 

stable.    





(5) Type II diabetes mellitus


Current Visit: Yes   Status: Acute   Comment: Sugars are under good control. 

Continue metformin and lispro sliding scale. A1c is good at 5.6%.   





(6) HLD (hyperlipidemia)


Current Visit: Yes   Status: Acute   Code(s): E78.5 - HYPERLIPIDEMIA, 

UNSPECIFIED   SNOMED Code(s): 01580460


   Comment: Check lipid profile.    





(7) Anxiety and depression


Current Visit: Yes   Status: Acute   Code(s): F41.9 - ANXIETY DISORDER, 

UNSPECIFIED; F32.9 - MAJOR DEPRESSIVE DISORDER, SINGLE EPISODE, UNSPECIFIED   

SNOMED Code(s): 296808311


   Comment: Continue trazodone.    





(8) DVT prophylaxis


Current Visit: Yes   Status: Acute   Code(s): OVW4636 -    SNOMED Code(s): 

002045444


   Comment: Eliquis   





(9) Full code status


Current Visit: Yes   Status: Acute   Code(s): Z78.9 - OTHER SPECIFIED HEALTH 

STATUS   SNOMED Code(s): 736457551

## 2017-04-15 VITALS — DIASTOLIC BLOOD PRESSURE: 80 MMHG | SYSTOLIC BLOOD PRESSURE: 140 MMHG

## 2017-04-15 RX ADMIN — OMEPRAZOLE SCH MG: 20 CAPSULE, DELAYED RELEASE ORAL at 05:29

## 2017-04-15 RX ADMIN — LAMOTRIGINE SCH MG: 100 TABLET ORAL at 08:18

## 2017-04-15 RX ADMIN — METFORMIN HYDROCHLORIDE SCH MG: 500 TABLET, FILM COATED ORAL at 08:18

## 2017-04-15 RX ADMIN — INSULIN LISPRO SCH: 100 INJECTION, SOLUTION INTRAVENOUS; SUBCUTANEOUS at 13:21

## 2017-04-15 RX ADMIN — ASPIRIN 325 MG ORAL TABLET SCH MG: 325 PILL ORAL at 08:19

## 2017-04-15 RX ADMIN — APIXABAN SCH MG: 5 TABLET, FILM COATED ORAL at 08:19

## 2017-04-15 RX ADMIN — METOPROLOL TARTRATE SCH MG: 25 TABLET, FILM COATED ORAL at 08:19

## 2017-04-15 RX ADMIN — INSULIN LISPRO SCH: 100 INJECTION, SOLUTION INTRAVENOUS; SUBCUTANEOUS at 07:44

## 2017-04-15 NOTE — HP
Amended report to enter date of admission.



CONSULT/HISTORY AND PHYSICAL:

 

DATE OF ADMISSION:  2017.

 

IDENTIFYING DATA:  Ms. Bran is a 62-year-old , mentally disabled 
 female who has been disabled for last 12 years due to severe bipolar 
depression, was hospitalized on the medical floor due to cardiac arrhythmia and 
some suspicion of cardiac ischemia.

 

CHIEF COMPLAINT:  "I was scared and very sad now."

 

HISTORY OF PRESENT ILLNESS:  This 62-year-old female with long history of 
mental illness, alcohol use disorder and multiple psychotic hospitalizations on 
behavioral health units including [facility] and [facility], was hospitalized on the medical floor because of arrhythmia.  I 
am going to focus mostly on her mental health problem.  Ms. Bran reports that 
her manic symptoms have been in good control with the current medications; 
however, in the recent past her depressive symptoms have worsened because she 
cut down on most of her medications because of this fear that she will run out 
if she could not see her doctor and get her medications renewed.  She describes 
her mood as depressed and sad, feels down all the time, cries a lot, isolates 
self in her house and has not been going out for a long, long time.  She 
somehow manages to cook, however, does not have the energy and motivation to 
clean, so there is filth all over her house and she does not know what to do 
about it.  She feels so lethargic and unmotivated that she is totally 
dysfunctional.  However, she says she still has hope and does not want to give 
up, also denies any suicidal or homicidal ideations.  She also denies any 
psychotic symptoms including hallucinations or delusions.  [place] from the doctor's offices and other city amenities.  She does 
not even feel like driving out of the country to come to the city to fulfill 
her needs, even see her psychiatrist.  She attributes this to her anxiety when 
she goes out, however, at home she is not as much anxious.  She does not have 
any social contacts other than staying in touch with her 2 daughters.  Although 
she had highs as she says it, by which she means the manic/hypomanic symptoms 
for a long time, she remembers the highs being lot of energy, lot of motivation
, mind racing and so on.

 

PAST PSYCHIATRIC HISTORY:  Multiple psychotic hospitalizations here at [facility] and 
[facility].  She was seeing Dr. Kaur at [facility] which she has stopped going because of her severe 
anxiety and unmotivation.

 

MEDICATIONS:  She is on multiple psychotropic medications includin.  Risperdal 0.5 mg twice a day.

2.  Wellbutrin  mg which used to be 300 mg in the past.

3.  Lamotrigine 200 mg p.o. b.i.d.

4.  Trazodone 50 mg p.o. at bedtime which used to be 100 mg at bedtime.

 

The patient has adjusted her dose of medications to lower dose range because of 
fear of running out of the meds as she was not keeping up with her appointments 
at the clinic.

 

PAST MEDICAL HISTORY:  Remarkable for multiple chronic medical conditions 
including, diabetes mellitus, hypertension and chronic pain syndrome.  She also 
takes Eliquis.

 

ALLERGIES:  No known drug allergies.

 

SUBSTANCE ABUSE HISTORY:  Ms. Bran has an extensive history of alcohol 
dependence beginning in her high school years which worsened in her early-to-
mid 20s .  She used to drink at least 1 L of vodka every other day with some 
additional beers and wine.  She quit drinking suddenly in  and stayed in 
her house during the time of severe withdrawal, since then she did not  
drinking again, however, she used AA Meetings to help her to overcome her 
drinking habit.

 

PERSONAL AND SOCIAL HISTORY:  Ms. Bran was  for years and had 2 
daughters who are in their mid-to-late 30s right now.  They live separately and 
she is in touch with them on regular basis.  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  
Since then she has been on disability.  She lives in a country close to [place] 
alone by herself, not in any significant relationship at this time.  She was 
 in late s after being  for several years.  No legal issues 
reported.

 

MENTAL STATUS EXAMINATION:  Ms. Bran was examined on her bedside, was wearing 
a hospital gown. She is appropriately dressed, fairly groomed, alert and 
oriented to time, place and person, in a pleasant mood and describes the mood 
as depressed and observed affect appears to be moderately constricted.  Her 
speech is normal in all spheres.  Intelligence appears to be average as 
evidenced by her vocabulary and fund of knowledge.  Memory function is intact 
in all spheres.  Denies any thought of perceptual disturbances.  Denies any 
current suicidal or homicidal thoughts.

 

SUMMARY:  This 62-year-old mentally disabled  female with long history 
of bipolar disorder and alcohol dependence, is experiencing worsening of her 
depressive symptoms since she cut down on her antidepressant Wellbutrin from 
300 mg to 150 mg.

 

DIAGNOSIS:  Bipolar disorder not otherwise specified, rule out bipolar 2 
disorder, rule out major depressive disorder.

 

RECOMMENDATIONS:  The patient's mental health condition is not too bad to be 
retained or transferred to a mental health unit, rather with some adjustment to 
her antidepressant she can be discharged home psychiatrically, however, she 
will need some assistance with reconnecting with her mental health providers at 
[facility] as soon as possible.  I have spoken to the 
PA taking care of her on the unit and explained my recommendations to her, 
which includes adjustment of her dose of Wellbutrin  mg to 300 mg.  
Please call behavioral health unit for assistance if there is any further need.

 

Thank you very much for giving us a chance to participate in Ms. Bran's care 
on your unit.

 

 

 

[id number]/[facility] #: [id number]

DIVYA

## 2017-04-15 NOTE — PN
Subjective


Date of Service: 04/15/17


Interval History: 





This is a 61 yo female with severe agoraphobia and bipolar disorder who 

presented with complaints of palpitations.  Patient was noted to be in rapid 

atrial fibrillation at admission and spontaneously converted to a sinus rhythm.

  She has remained sinus since that time.  Anticoagulation has been started in 

the form of Eliquis.





Her mental health has become a concern as patient lives in very poor conditions 

and her agoraphobia has progressed to the point that she will only leave the 

house for food.  She has ran out of all of her prior medications and has not 

been seen by a mental health provider or primary care provider in quite some 

time.  A psychiatric consultation has been requested.





Patient offers no acute concerns today and feels that she is otherwise ready to 

return home after meeting with the psychiatrist.





Objective


Active Medications: 








Acetaminophen (Tylenol Tab*)  650 mg PO Q6H PRN


   PRN Reason: FEVER/PAIN


Albuterol (Ventolin 2.5 Mg/3 Ml Neb.Sol*)  2.5 mg INH Q2H PRN


   PRN Reason: SOB/WHEEZING


Amlodipine Besylate (Norvasc Tab*)  10 mg PO DAILY [facility]


   Last Admin: 04/15/17 08:19 Dose:  10 mg


Apixaban (Eliquis*)  5 mg PO 0900,1800 [facility]


   Last Admin: 04/15/17 08:19 Dose:  5 mg


Aspirin (Aspirin Tab*)  325 mg PO DAILY [facility]


   Last Admin: 04/15/17 08:19 Dose:  325 mg


Insulin Human Lispro (Humalog*)  0 units SUBCUT ACHS [facility]


   PRN Reason: Protocol


   Last Admin: 04/15/17 07:44 Dose:  Not Given


Lamotrigine (Lamictal Tab(*))  200 mg PO BID [facility]


   Last Admin: 04/15/17 08:18 Dose:  50 mg


Melatonin (Melatonin (Nf))  3 mg PO BEDTIME PRN; Protocol


   PRN Reason: Sleep


   Last Admin: 04/13/17 03:29 Dose:  3 mg


Metformin HCl (Glucophage*)  1,000 mg PO BID [facility]


   Last Admin: 04/15/17 08:18 Dose:  1,000 mg


Metoprolol Tartrate (Lopressor Tab*)  25 mg PO BID [facility]


   Last Admin: 04/15/17 08:19 Dose:  25 mg


Omeprazole (Prilosec Cap*)  20 mg PO DAILY@0600 [facility]


   Last Admin: 04/15/17 05:29 Dose:  20 mg


Ondansetron HCl (Zofran Inj*)  4 mg IV Q6H PRN


   PRN Reason: NAUSEA


Tramadol HCl (Ultram*)  50 mg PO Q6H PRN


   PRN Reason: PAIN


Trazodone HCl (Desyrel Tab*)  50 mg PO BEDTIME PRN


   PRN Reason: SLEEP


   Last Admin: 04/14/17 00:01 Dose:  50 mg











Vital Signs:











Temp Pulse Resp BP Pulse Ox


 


 98.2 F   56   14   140/80   98 


 


 04/15/17 07:39  04/15/17 08:23  04/15/17 08:23  04/15/17 07:42  04/15/17 08:23











Oxygen Devices in Use Now: None


Appearance: Well appearing, in NAD, mildly anxious


Respiratory: Symmetrical Chest Expansion and Respiratory Effort, Clear to 

Auscultation


Cardiovascular: NL Sounds; No Murmurs; No JVD, RRR


Abdominal: NL Sounds; No Tenderness; No Distention


Extremities: No Edema


Skin: No Rash or Ulcers


Neurological: Alert and Oriented x 3


Result Diagrams: 


 04/13/17 04:57





 04/12/17 20:10


Additional Lab and Data: 





.


Diagnostic Imaging: 





Nuc stress test - low risk, no ischemia or infarct





Echo - LVEF 60-65%, no valvular dz











Assess/Plan/Problems-Billing


Ms Bran is a 61 yo F who has a h/o HTN, Type II DM, CAD, HLD and anxiety/

depression who presented to the ER for c/o palpitations and chest discomfort. 





- Patient Problems


(1) Atrial fibrillation with RVR


Comment: Spontaneously converted to NSR


Echo shows a normal EF and wall motion, no significant valvular disease. 


Continue metoprolol 25mg BID and eliquis 5mg BID.    





(2) Anxiety and depression


Comment: 


Severe agoraphobia with reported history of bipolar disorder


Awaiting psychiatric consultation


Continue trazodone.    





(3) Demand ischemia


Comment: 


Mildly elevated troponins in the setting of rapid afib


Stress testing unremarkable


Now CP free   





(4) CAD (coronary artery disease)


Comment: 


No evidence of ACS


Continue ASA and metoprolol.    





(5) Chest pain


Comment: 


Stress test negative for ischemia or infarct.    





(6) HLD (hyperlipidemia)


Comment: 


TC >200 with  mg/dl


Started high dose statin based on risk factors   





(7) HTN (hypertension)


Comment: 


BP remains uncontrolled-at times 180-200 systolic even after increasing 

amlodipine to 10mg daily


Will start an evening dose of lisinopril for improved control which should be 

beneficial with her h/o DM and CAD   





(8) Type II diabetes mellitus


Comment: 


HgbA1c is good at 5.6%


Cont metformin, may be able to reduce dose or discontinue all together in the 

near future, will leave this to her PCP   





(9) DVT prophylaxis


Comment: 


Eliquis   





(10) Full code status





Status and Disposition: 





Awaiting psychiatric consultation.  Pending discharge for later today or 

tomorrow.

## 2017-04-15 NOTE — CONSULT
Identification





- Patient Identification


Reason for Psychiatric Consultation: Patient Distress


-: 


Patient is a 62 year old, F admitted on 04/13/17.








- MHU Identification


Employment Status: Disabled


Hx Psychiatric Hospitalization: Yes - Both at [facility] and [facility] in the past for 

Bipolar D/O.


Arrived to Hospital Via: Ambulance





History





- Objective


HPI: 





61 y/o mentally disabled,  female currently admitted to medical due to 

cardiac arrhythmia was also complaining of depression/anxiety leading up to her 

inability to go out of her home even for her doctor's appointments. However she 

is denying hallucinations, delusions and suicidal/homicidal ideation.


Please review my dictated H&P/consult note for details.


Plan is to adjust her WellbutrinXL dose to 300mg po daily, help her for an 

earliest appointment with her providers at [facility] before discharge.


Lab Results: 


 Laboratory Tests











  04/13/17 04/13/17 04/14/17





  16:32 20:17 08:02


 


POC Glucose (mg/dL)  90  132 H  122 H














  04/14/17 04/14/17 04/15/17





  16:54 20:08 07:35


 


POC Glucose (mg/dL)  90  115 H  92














Exam


Appearance: Healthy Appearing


Hygiene: Normal


Grooming: Fairly Well Kept


Psychomotor Activities: Normal


Exhibits Abnormal Movement: No


Attitude and Relatedness: Appropriate


Eye Contact: Good





- Speech


Quality: Unpressured


Latencies: Normal


Quantity: Appropriate


Patient's Decription of Mood: "Sad"


Observed Affect: Constricted


Affect Consistent with: Dysphoria


Patient's Thought Process: Coherent, Goal Directed


Thought Content: No Passive Death Wish, No Suicidal Planning, No Homicidal 

Ideation, No Paranoid Ideation


Experiencing Hallucinations: No, Sensorium is Clear


Type of Hallucinations: Visual: No, Auditory: No, Command: No


Level of Consciousness: Alert


Orientation: Yes Intact, Yes Orientated to Time, Yes Orientated to Place, Yes 

Orientated to Person


Impulse Control: Intact


Insight and Judgement: Good





Impression





- Impression


Merits Inpatient Hospitalization: No





- Axis I


Mental Illness: Bipolar depression.  Anxiety D/O, NOS





- Axis III


Medical Illness: DM-2, HTN





Plan





- Treatment Plan


Continued Medication Management: Continue Outpt Medication


Medications: 


 Current Medications





Acetaminophen (Tylenol Tab*)  650 mg PO Q6H PRN


   PRN Reason: FEVER/PAIN


Albuterol (Ventolin 2.5 Mg/3 Ml Neb.Sol*)  2.5 mg INH Q2H PRN


   PRN Reason: SOB/WHEEZING


Amlodipine Besylate (Norvasc Tab*)  10 mg PO DAILY [facility]


   Last Admin: 04/15/17 08:19 Dose:  10 mg


Apixaban (Eliquis*)  5 mg PO 0900,1800 [facility]


   Last Admin: 04/15/17 08:19 Dose:  5 mg


Aspirin (Aspirin Tab*)  325 mg PO DAILY [facility]


   Last Admin: 04/15/17 08:19 Dose:  325 mg


Atorvastatin Calcium (Lipitor*)  40 mg PO 2100 [facility]


Insulin Human Lispro (Humalog*)  0 units SUBCUT ACHS [facility]


   PRN Reason: Protocol


   Last Admin: 04/15/17 07:44 Dose:  Not Given


Lamotrigine (Lamictal Tab(*))  200 mg PO BID [facility]


   Last Admin: 04/15/17 08:18 Dose:  50 mg


Lisinopril (Prinivil Tab*)  5 mg PO 2100 [facility]


Melatonin (Melatonin (Nf))  3 mg PO BEDTIME PRN; Protocol


   PRN Reason: Sleep


   Last Admin: 04/13/17 03:29 Dose:  3 mg


Metformin HCl (Glucophage*)  1,000 mg PO BID [facility]


   Last Admin: 04/15/17 08:18 Dose:  1,000 mg


Metoprolol Tartrate (Lopressor Tab*)  25 mg PO BID [facility]


   Last Admin: 04/15/17 08:19 Dose:  25 mg


Omeprazole (Prilosec Cap*)  20 mg PO DAILY@0600 [facility]


   Last Admin: 04/15/17 05:29 Dose:  20 mg


Ondansetron HCl (Zofran Inj*)  4 mg IV Q6H PRN


   PRN Reason: NAUSEA


Tramadol HCl (Ultram*)  50 mg PO Q6H PRN


   PRN Reason: PAIN


Trazodone HCl (Desyrel Tab*)  50 mg PO BEDTIME PRN


   PRN Reason: SLEEP


   Last Admin: 04/14/17 00:01 Dose:  50 mg











- Discharge Plan


Discharge Plan: Outpatient Follow Up


[place] [facility]

## 2017-04-16 NOTE — DS
DISCHARGE SUMMARY:

 

DATE OF ADMISSION:  17

 

DATE OF DISCHARGE:  04/15/17

 

PRIMARY CARE PROVIDER TO BE ESTABLISHED:  Dr. Maria C Vaz.

 

PSYCHIATRIST:  Dr. Kaur with [facility].

 

DISCHARGING PROVIDER:  NIMO Soria.

 

SUPERVISING PHYSICIAN:  Dr. Abram Guo* (dictated by NIMO Soria).

 

PRIMARY DISCHARGE DIAGNOSES:

1.  Rapid atrial fibrillation.

2.  Demand ischemia.

3.  Chest pain.

 

SECONDARY DISCHARGE DIAGNOSES:

1.  Bipolar disorder with severe agoraphobia.

2.  Coronary artery disease without evidence of acute coronary syndrome.

3.  Hyperlipidemia.

4.  Hypertension.

5.  Non-insulin-dependent diabetes.

 

DISCHARGE MEDICATIONS:

1.  Eliquis 5 mg p.o. b.i.d.

2.  Aspirin 81 mg p.o. daily.

3.  Lipitor 40 mg p.o. at bedtime.

4.  Bupropion  mg p.o. daily.

5.  Lisinopril 5 mg p.o. at bedtime.

6.  Metoprolol tartrate 25 mg p.o. b.i.d.

7.  Multivitamin 1 tablet p.o. daily.

8.  Omeprazole 20 mg p.o. daily.

9.  Amlodipine 10 mg p.o. daily.

10.  Hydroxyzine 5 mg p.o. twice daily as needed for anxiety.

11.  Lamictal 200 mg p.o. b.i.d.

12.  Metformin 1000 mg p.o. b.i.d.

13.  Trazodone 5 mg p.o. at bedtime.

 

MEDICATION CHANGES:  The patient had not been on any of the above medications 
in quite sometime.

 

HOSPITAL IMAGIN.  Chest x-ray shows no acute disease.

2.  CTA of the chest shows no evidence of PE.  There is evidence of 
cholelithiasis, but without biliary ductal dilatation, small mediastinal lymph 
nodes, and right hilar lymph nodes measuring up to 10 mm in size.

3.  Nuclear stress test shows no fixed or stress-induced defects, deemed a low 
risk study.

4.  Transthoracic echocardiogram demonstrates left ventricular ejection 
fraction of 60% to 65% without any valvular abnormalities.

5.  Initial EKG shows atrial fibrillation with a rapid rate.

6.  EKG the following morning shows normal sinus rhythm.

 

HOSPITAL COURSE:  This is a 62-year-old female with a history of coronary 
artery disease, non-insulin-dependent diabetes, bipolar disorder, and severe 
agoraphobia who presented to the emergency department with complaints of chest 
pain and palpitations.  The patient's agoraphobia had increased to the point 
that she had stopped leaving the house and had stopped seeing both her 
psychiatrist and her primary care provider and had subsequently run out of all 
of her medications quite sometime ago.  She states that she would leave the 
house only for food and only when absolutely necessary.  These symptoms 
described above were concerning enough that she originally went to [facility] 
care and was later transferred to the emergency department for further 
evaluation.  When she reached the emergency department, her initial EKG 
demonstrated atrial fibrillation with a rate of approximately 150 beats per 
minute.  The patient's initial labs showed a mildly elevated D-dimer measured 
at 260.  Comprehensive metabolic panel was unremarkable and mildly elevated 
troponin of 0.04 and a normal CBC.  Chest x-ray showed no acute findings.

 

The patient spontaneously converted to normal sinus rhythm shortly after 
admission and remained in a sinus rhythm the remainder of her hospital stay.  
Repeat troponins increased to 0.09 and then improved to 0.07.  The patient 
underwent nuclear stress testing which demonstrated no significant ischemia.

 

In light of her mental health concerns, the patient's daughter stated that her 
living conditions were quite unsafe and unsanitary and requested psychiatric 
consultation prior to leaving the hospital.

 

The patient seemed to have very good insight into her mental health and stated 
that she had tapered herself off her medications when she knew that she was 
going to be running out and did not feel that she could make her mental health 
appointment due to the severe amount of anxiety that she had upon leaving the 
house.  She was evaluated by psychiatrist, Dr Ramirez, during her hospital stay, 
who recommended she resume her prior medications as listed above and increase 
her dose of Wellbutrin to 300mg daily. She is agreeable to resume treatment and 
will plan to return to her psychiatrist for further treatment.

 

DISPOSITION:  The patient is being discharged to home.  All of the medications 
described above are essentially new medications to her and a 30-day supply has 
been sent to the pharmacy of her choice.  She wished to establish new primary 
care close to home and she lives in [place], so she wishes to establish care 
with Dr. aVz.  A copy of this report will be sent to him.  She is in 
agreement to return to [facility], but eventually would like 
to find someone closer to home if at all possible.

 

____________________________________ 

NIMO SORIA

 

CC:  Dr. Vaz; Dr. Kaur*

 

[id number]/CPS #: [id number]

[facility]

## 2018-11-05 ENCOUNTER — HOSPITAL ENCOUNTER (INPATIENT)
Dept: HOSPITAL 25 - ED | Age: 63
LOS: 3 days | Discharge: NURSING, REHAB OR LONG-TERM CARE | DRG: 812 | End: 2018-11-08
Attending: INTERNAL MEDICINE | Admitting: INTERNAL MEDICINE
Payer: MEDICARE

## 2018-11-05 DIAGNOSIS — K21.9: ICD-10-CM

## 2018-11-05 DIAGNOSIS — E11.9: ICD-10-CM

## 2018-11-05 DIAGNOSIS — R26.9: ICD-10-CM

## 2018-11-05 DIAGNOSIS — I48.91: ICD-10-CM

## 2018-11-05 DIAGNOSIS — Z87.891: ICD-10-CM

## 2018-11-05 DIAGNOSIS — F41.9: ICD-10-CM

## 2018-11-05 DIAGNOSIS — I77.819: ICD-10-CM

## 2018-11-05 DIAGNOSIS — Z88.1: ICD-10-CM

## 2018-11-05 DIAGNOSIS — R29.6: ICD-10-CM

## 2018-11-05 DIAGNOSIS — R94.31: ICD-10-CM

## 2018-11-05 DIAGNOSIS — I10: ICD-10-CM

## 2018-11-05 DIAGNOSIS — K44.9: ICD-10-CM

## 2018-11-05 DIAGNOSIS — F31.9: ICD-10-CM

## 2018-11-05 DIAGNOSIS — Z90.710: ICD-10-CM

## 2018-11-05 DIAGNOSIS — Z79.84: ICD-10-CM

## 2018-11-05 DIAGNOSIS — F91.9: ICD-10-CM

## 2018-11-05 DIAGNOSIS — I25.10: ICD-10-CM

## 2018-11-05 DIAGNOSIS — I08.3: ICD-10-CM

## 2018-11-05 DIAGNOSIS — D64.9: Primary | ICD-10-CM

## 2018-11-05 DIAGNOSIS — R00.0: ICD-10-CM

## 2018-11-05 DIAGNOSIS — Z81.8: ICD-10-CM

## 2018-11-05 DIAGNOSIS — Z88.8: ICD-10-CM

## 2018-11-05 DIAGNOSIS — Z95.5: ICD-10-CM

## 2018-11-05 DIAGNOSIS — Z79.01: ICD-10-CM

## 2018-11-05 DIAGNOSIS — Z79.82: ICD-10-CM

## 2018-11-05 DIAGNOSIS — Z81.1: ICD-10-CM

## 2018-11-05 DIAGNOSIS — K92.1: ICD-10-CM

## 2018-11-05 LAB
BASOPHILS # BLD AUTO: 0 10^3/UL (ref 0–0.2)
EOSINOPHIL # BLD AUTO: 0.1 10^3/UL (ref 0–0.6)
HCT VFR BLD AUTO: 25 % (ref 35–47)
HGB BLD-MCNC: 8.9 G/DL (ref 12–16)
INR PPP/BLD: 3.31 (ref 0.77–1.02)
LYMPHOCYTES # BLD AUTO: 1.4 10^3/UL (ref 1–4.8)
MCH RBC QN AUTO: 29 PG (ref 27–31)
MCHC RBC AUTO-ENTMCNC: 35 G/DL (ref 31–36)
MCV RBC AUTO: 81 FL (ref 80–97)
MONOCYTES # BLD AUTO: 0.7 10^3/UL (ref 0–0.8)
NEUTROPHILS # BLD AUTO: 8.1 10^3/UL (ref 1.5–7.7)
NRBC # BLD AUTO: 0 10^3/UL
NRBC BLD QL AUTO: 0.1
PLATELET # BLD AUTO: 212 10^3/UL (ref 150–450)
RBC # BLD AUTO: 3.13 10^6/UL (ref 4–5.4)
WBC # BLD AUTO: 10.3 10^3/UL (ref 3.5–10.8)
WBC UR QL AUTO: (no result)

## 2018-11-05 PROCEDURE — 99285 EMERGENCY DEPT VISIT HI MDM: CPT

## 2018-11-05 PROCEDURE — 80053 COMPREHEN METABOLIC PANEL: CPT

## 2018-11-05 PROCEDURE — 86901 BLOOD TYPING SEROLOGIC RH(D): CPT

## 2018-11-05 PROCEDURE — 93005 ELECTROCARDIOGRAM TRACING: CPT

## 2018-11-05 PROCEDURE — 85014 HEMATOCRIT: CPT

## 2018-11-05 PROCEDURE — 87641 MR-STAPH DNA AMP PROBE: CPT

## 2018-11-05 PROCEDURE — 93306 TTE W/DOPPLER COMPLETE: CPT

## 2018-11-05 PROCEDURE — 82607 VITAMIN B-12: CPT

## 2018-11-05 PROCEDURE — 83735 ASSAY OF MAGNESIUM: CPT

## 2018-11-05 PROCEDURE — 93017 CV STRESS TEST TRACING ONLY: CPT

## 2018-11-05 PROCEDURE — 85610 PROTHROMBIN TIME: CPT

## 2018-11-05 PROCEDURE — 86922 COMPATIBILITY TEST ANTIGLOB: CPT

## 2018-11-05 PROCEDURE — A9505 TL201 THALLIUM: HCPCS

## 2018-11-05 PROCEDURE — 85025 COMPLETE CBC W/AUTO DIFF WBC: CPT

## 2018-11-05 PROCEDURE — 86927 PLASMA FRESH FROZEN: CPT

## 2018-11-05 PROCEDURE — C8929 TTE W OR WO FOL WCON,DOPPLER: HCPCS

## 2018-11-05 PROCEDURE — 36415 COLL VENOUS BLD VENIPUNCTURE: CPT

## 2018-11-05 PROCEDURE — 87086 URINE CULTURE/COLONY COUNT: CPT

## 2018-11-05 PROCEDURE — 84443 ASSAY THYROID STIM HORMONE: CPT

## 2018-11-05 PROCEDURE — 84484 ASSAY OF TROPONIN QUANT: CPT

## 2018-11-05 PROCEDURE — 81003 URINALYSIS AUTO W/O SCOPE: CPT

## 2018-11-05 PROCEDURE — 80175 DRUG SCREEN QUAN LAMOTRIGINE: CPT

## 2018-11-05 PROCEDURE — 86900 BLOOD TYPING SEROLOGIC ABO: CPT

## 2018-11-05 PROCEDURE — 85730 THROMBOPLASTIN TIME PARTIAL: CPT

## 2018-11-05 PROCEDURE — 99156 MOD SED OTH PHYS/QHP 5/>YRS: CPT

## 2018-11-05 PROCEDURE — 81015 MICROSCOPIC EXAM OF URINE: CPT

## 2018-11-05 PROCEDURE — 85018 HEMOGLOBIN: CPT

## 2018-11-05 PROCEDURE — 80048 BASIC METABOLIC PNL TOTAL CA: CPT

## 2018-11-05 PROCEDURE — 83605 ASSAY OF LACTIC ACID: CPT

## 2018-11-05 PROCEDURE — P9040 RBC LEUKOREDUCED IRRADIATED: HCPCS

## 2018-11-05 PROCEDURE — 82272 OCCULT BLD FECES 1-3 TESTS: CPT

## 2018-11-05 PROCEDURE — P9017 PLASMA 1 DONOR FRZ W/IN 8 HR: HCPCS

## 2018-11-05 PROCEDURE — 78452 HT MUSCLE IMAGE SPECT MULT: CPT

## 2018-11-05 PROCEDURE — 86850 RBC ANTIBODY SCREEN: CPT

## 2018-11-05 PROCEDURE — 70450 CT HEAD/BRAIN W/O DYE: CPT

## 2018-11-05 RX ADMIN — LAMOTRIGINE SCH MG: 100 TABLET ORAL at 22:56

## 2018-11-05 RX ADMIN — ATORVASTATIN CALCIUM SCH MG: 40 TABLET, FILM COATED ORAL at 22:56

## 2018-11-05 NOTE — HP
CC:  Dr. Vaz; Dr. Whatley *

 

HISTORY AND PHYSICAL:

 

DATE OF ADMISSION:  18

 

PRIMARY CARE PROVIDER:  Dr. Vaz.

 

ATTENDING PHYSICIAN WHILE IN THE HOSPITAL:  Heather Jessica MD * (report dictated 
by Uri Durate NP)

 

CONSULTING GASTROENTEROLOGIST:  Dr. Whatley.

 

CHIEF COMPLAINT:

1.  Dizziness.

2.  Falls.

3.  Not feeling well.

 

HISTORY OF PRESENT ILLNESS:  Mrs. Bran is a 63-year-old female patient with a 
history of AFib, on chronic Coumadin; history of bipolar disorder; CAD; 
hyperlipidemia; hypertension; diabetes; history of anxiety, depression; and 
severe agoraphobia.  She is coming in to our emergency department today stating 
that the last week, she has been having increasing falls.  She has not been 
feeling well. She has been feeling lightheaded and dizzy at times and when we 
asked her to try to describe the dizziness, she states she just does not feel 
well.  She is very vague in her complaint.  She denied any worsening gait.  She 
states she has had issues with falling for several years.  She states over the 
last 2 days, she has noticed that her stools have been dark.  She has been 
feeling more short of breath, particularly with exertion.  She has been having 
some nausea.  She has not vomited and she has been feeling weak and she has 
noticed that she just felt tired, fatigued, not feeling well and having 
dizziness particularly with positional change.  She came into the ED, was heme-
positive and noted to have a drop in her H and H and also again was complaining 
of tarry stools, although we have not seen any here in the ED.  We were asked 
to evaluate for admission.  There have been no reports of fever, no reports of 
URI symptoms and again no shortness of breath at rest or chest discomfort or 
pain.

 

PAST MEDICAL HISTORY:  Significant for:

1.  AFib.

2.  Bipolar disorder.

3.  CAD.

4.  Hyperlipidemia.

5.  Hypertension.

6.  Diabetes.

7.  Anxiety.

8.  Depression.

9.  Severe agoraphobia.

 

PAST SURGICAL HISTORY:  The patient has had:

1.  Heart catheterization.

2.  Hysterectomy.

 

HOME MEDICATIONS:  Include:

1.  Lisinopril 5 mg p.o. daily.

2.  Bupropion 300 mg p.o. daily.

3.  Metformin 1000 mg p.o. b.i.d.

4.  Lamictal 200 mg p.o. b.i.d.

5.  Atarax 50 mg p.o. b.i.d. as needed.

6.  Lipitor 40 mg p.o. at bedtime.

7.  Amlodipine 10 mg daily.

8.  Aspirin 81 mg daily.

9.  Trazodone 100 mg p.o. at bedtime.

10.  Prilosec 20 mg daily.

11.  Warfarin 2 to 6 mg p.o. at bedtime.

12.  Lopressor 25 mg p.o. daily.

 

ALLERGIES TO MEDICATIONS:  Include ERYTHROMYCIN and GABAPENTIN.

 

FAMILY HISTORY:  Mother had a history of CHF, she  at 87.  Father had a 
history of CHF, he  at 90.

 

SOCIAL HISTORY:  She is a former smoker, she quit in .  Former alcoholic, 
she quit drinking about 14 years ago.  Surrogate decision maker is her daughter
, Bettye.

 

REVIEW OF SYSTEMS:  There is no documented fever.  She denied having any 
significant weight change.  There is no double vision.  She denies having any 
ear discharge.  There is no rhinorrhea. She denied having any sore throat.  
There is no thyroid enlargement.  She denied having any chest pain.  There was 
no orthopnea. There is dyspnea on exertion.  There was no abdominal pain.  
There was no nausea, no vomiting.  No dysuria, no frequency.  No seizure, no 
loss of consciousness.  No pruritus, no skin ulcerations.  Review of 14 systems 
completed, all others negative.

 

                               PHYSICAL EXAMINATION

 

GENERAL:  At this time, Mrs. Bran is a 63-year-old female patient.  She is 
sitting in the ED stretcher.  She does not appear to be in any acute distress.

 

VITAL SIGNS:  Blood pressure 147/79 with a pulse of 104, respirations were 20, 
O2 saturation 99%, temperature 98.6.

 

HEENT:  Head:  Atraumatic, normocephalic.  Eyes:  EOMs intact.  Sclerae 
anicteric and not pale.  Throat:  Oral mucosa appears to be dry.  No 
oropharyngeal erythema.

 

NECK:  Supple.

 

LUNGS:  Clear to auscultation bilaterally.  No wheezes, rales, rhonchi.

 

HEART:  Sounds S1, S2.  She had a regular rate and rhythm.  No murmurs, rubs, 
or gallops.

 

ABDOMEN:  Soft.  It was flat, nontender.  Bowel sounds were present.

 

EXTREMITIES:  Pulses were 2+ throughout.  She is moving all 4 extremities with 5
/5 strength.

 

NEUROLOGICAL:  The patient is awake.  She is alert.  She is oriented x3.  She 
had no drift.  She had no gross focal deficits.  Speech clear.  Tongue midline.
  Visual fields were intact.  There was no nystagmus.

 

SKIN:  She did have ecchymosis noted to her right flank.  Also, bruising noted 
to her bilateral knees and to her forehead from falls.

 

 LABORATORY DATA/DIAGNOSTIC STUDIES:  The labs today, WBC 10.3, RBC of 3.13, 
hemoglobin 8.9, hematocrit of 25, platelet count of 212.  INR 3.31.  PTT of 41. 
Sodium 132, potassium 4.1, chloride 100, bicarb 24, BUN 14, creatinine 0.70, 
glucose 273.  Lactic 1.8.  Calcium 8.9, mag 1.5.  Total bili 1.1, AST 22, ALT 13
, alk phos 65.  Troponin 0.01.  Repeat troponin 0.01.  TSH was normal.  



Brain CT obtained today revealed no acute pathology,  no change, chronic small 
vessel ischemic changes.  



EKG today showing sinus tachycardia, rate of 104 with diffuse ST depression.  
When reviewed to the previous EKG, the ST depression is new compared to the 
previous EKGs.  Old medical records were reviewed.

 

ASSESSMENT AND PLAN:  Mrs. Bran is a 63-year-old female patient with multiple 
medical problems coming in to the ED today with complaints of tarry stools, 
weakness, and fall, found to have presumed GI bleed.  She will be admitted 
under inpatient status of ICU for:

 

1.  GI bleed.  I touched base with Dr. Whatley.  She appears to be 
hemodynamically stable in terms of blood pressure, but the heart rate is fast 
at 104.  I am going to give her a unit of blood, 2 units of FFP and vitamin K 
to reverse that INR.  We will check serial H and H's.  Two IVs will be placed.  
We will continue to monitor her closely and plan for EGD tomorrow and I will 
put her on a PPI drip.

2.  EKG changes.  I suspect this is probably demand ischemia from the 
tachycardia and GI bleed.  We will treat the underlying cause and monitor.  For 
the time being, she is not having any active cardiac symptoms and her troponins 
have been stable. I did order an echo.

3.  Atrial fibrillation.  Again, we will treat.  She is a little tachy at 104.  
I am going to try to give her blood and FFP to see if this will help get the 
heart rate to come down.  I am going to go ahead and hold her Coumadin 
obviously and reverse and she does note she is at increased risk for stroke 
while reversing her Coumadin.

4.  History of bipolar with anxiety and depression.  Continue meds as 
prescribed.

5.  Coronary artery disease.  Again, she does have EKG changes.  I am holding 
the aspirin.  I will continue her statin.  We can consider restarting the beta-
blocker tomorrow depending on her clinical course throughout the night, but she 
takes it once a day, so we will reevaluate restarting it tomorrow.

6.  Hyperlipidemia.  Continue statin therapy.

7.  Hypertension.  In the setting of acute GI bleed, I would rather have her 
blood pressure on the higher side, I am holding her BP meds.  We can slowly 
reintroduce as she improves.

8.  Diabetes.  Lispro sliding scale has been ordered.

9.  Falls.  Etiology unclear.  She has had increasing falls over the last week. 
This could be medication related.  I am checking a Lamictal level.  She may 
have had a slow bleed.  Certainly, the H and H could be contributing.  I will 
check a UA as well.  CT brain was negative.  I would like to get her H and H up 
and get the bleeding to stop and then reevaluate these falls to see how she is 
doing.  We may need to consider Neurology input.  I did order B12 along with 
Lamictal.  We will continue to follow.

10.  DVT prophylaxis.  I have ordered SCDs given the acute bleed.

11.  Code status.  Full code.

12.  Fluids, electrolytes, and nutrition.  Clear liquid diet, n.p.o. after 
midnight.

 

TIME SPENT:  Time spent on the admission was 60 minutes, greater than half of 
the time spent face-to-face with the patient obtaining my history and physical, 
the other half of the time was spent going over the plan of care with the 
patient and implementing the plan of care.

 

I did discuss the plan with my attending, Dr. Jessica; she is in agreement.

 

 ____________________________________ URI DUARTE, PAULIAN

 

[id number]/CPS #: [id number]

DIVYA

## 2018-11-05 NOTE — ED
Psychiatric Complaint





- HPI Summary


HPI Summary: 





A 64 y/o female presents to the ED c/o social isolation since before 11/5/2018. 

She also c/o falling more in the past month. She states that she has no family 

around and her dogs are what keep her going. She admits that she has not been 

able to take care of herself. She lives by herself and is not keeping up with 

her personal hygiene. She states that there are times when "what should matter 

doesn't matter". She denies SI. She also c/o hip pain and bruises. She notes 

that she is bipolar. She denies Fever, Chills, Erythema (eyes), Sore throat, 

Chest pain, Shortness of Breath, Cough, Abdominal pain, Vomiting, Nausea, 

Dysuria, Hematuria, Myalgia, Edema, Rash and Dizziness.





- History Of Current Complaint


Chief Complaint: EDDizziness


Time Seen by Provider: 11/05/18 13:56


Hx Obtained From: Patient


Onset/Duration: Gradual Onset


Timing: Constant


Severity Initially: Moderate


Severity Currently: Moderate


Associated Signs And Symptoms: Positive: Social Isolation





- Allergies/Home Medications


Allergies/Adverse Reactions: 


 Allergies











Allergy/AdvReac Type Severity Reaction Status Date / Time


 


MS Erythromycin Allergy Intermediate Nausea And Verified 11/05/18 14:26





[Erythromycin]   Vomiting  


 


MS Gabapentin [Gabapentin] Allergy Unknown Unknown Verified 11/05/18 14:26





   Reaction  





   Details  











Home Medications: 


 Home Medications





Aspirin EC TAB* [Ecotrin EC Low Dose 81 MG*] 81 mg PO DAILY 11/05/18 [History 

Confirmed 11/05/18]


BuPROPion XL* [Bupropion XL*] 300 mg PO DAILY 11/05/18 [History Confirmed 11/05/ 18]


Metoprolol Tartrate TAB* [Lopressor TAB*] 25 mg PO DAILY 11/05/18 [History 

Confirmed 11/05/18]


Omeprazole CAP* [Prilosec CAP* 20 MG] 20 mg PO DAILY 11/05/18 [History 

Confirmed 11/05/18]


Warfarin TAB(*) [Coumadin TAB(*)] 2 - 6 mg PO QPM 11/05/18 [History Confirmed 11 /05/18]


traZODone TAB* [Desyrel TAB*] 100 mg PO BEDTIME 11/05/18 [History Confirmed 11/ 05/18]











PMH/Surg Hx/FS Hx/Imm Hx


Endocrine/Hematology History: Reports: Hx Diabetes


   Denies: Hx Anticoagulant Therapy, Hx Thyroid Disease


Cardiovascular History: Reports: Hx Angina, Hx Hypercholesterolemia, Hx 

Hypertension - W/MEDS, Other Cardiovascular Problems/Disorders - PT.STATES 

HEART MURMUR BICUSPID CONDITION


   Denies: Hx Coronary Artery Disease, Hx Myocardial Infarction, Hx Pacemaker/

ICD


Respiratory History: 


   Denies: Hx Asthma, Hx Chronic Obstructive Pulmonary Disease (COPD)


 History: 


   Denies: Hx Renal Disease


Sensory History: Reports: Hx Contacts or Glasses


   Denies: Hx Hearing Aid


Opthamlomology History: Reports: Hx Contacts or Glasses


Neurological History: 


   Denies: Hx Dementia, Hx Seizures


Psychiatric History: Reports: Hx Anxiety, Hx Depression, Hx Panic Disorder - BI 

POLAR, Hx Bipolar Disorder - hospitalized in 3/2010, Hx Substance Abuse


   Denies: Hx Eating Disorder





- Surgical History


Surgery Procedure, Year, and Place: CESARIAN 1981,1983; HYSTERECTOMY 2008; 

STENT LAST NOVEMBER,[facility]


Infectious Disease History: No


Infectious Disease History: 


   Denies: Hx Hepatitis, Hx Human Immunodeficiency Virus (HIV), History Other 

Infectious Disease, Traveled Outside the [place] in Last 30 Days





- Family History


Known Family History: Positive: Other - depression, alcohol abuse





- Social History


Alcohol Use: None


Substance Use Type: Reports: None


Smoking Status (MU): Former Smoker





Review of Systems


Negative: Fever, Chills


Negative: Erythema


Negative: Sore Throat


Negative: Chest Pain


Negative: Shortness Of Breath, Cough


Negative: Abdominal Pain, Vomiting, Nausea


Negative: dysuria, hematuria


Positive: Myalgia - hip pain.  Negative: Edema


Positive: Bruising.  Negative: Rash


Neurological: Negative - dizziness


Positive: Other - Positive: social isolation


All Other Systems Reviewed And Are Negative: Yes





Physical Exam





- Summary


Physical Exam Summary: 





Constitutional: Poor personal hygiene, dirt under fingernails


Skin: Warm, Dry


HENT: Normocephalic; Atraumatic


Eyes: Conjunctiva normal


Neck: Musculoskeletal ROM normal neck. (-) JVD, (-) Stridor, (-) Tracheal 

deviation


Cardio: Rhythm regular, rate normal, Heart sounds normal; Intact distal pulses; 

The pedal pulses are 2+ and symmetric. Radial pulses are 2+ and symmetric. (-) 

Murmur


Pulmonary/Chest wall: Effort normal. (-) Respiratory distress, (-) Wheezes, (-) 

Rales


Abd: Large hematoma over left side, bruising in various stages of healing, (-) 

epigastric tenderness, (-) Distension, (-) Guarding, (-) Rebound


Musculoskeletal: (-) Edema


Lymph: (-) Cervical adenopathy


Neuro: Alert, Oriented x3


Psych: Mood and affect Normal


GCS: 15


Triage Information Reviewed: Yes


Vital Signs On Initial Exam: 


 Initial Vitals











Temp Pulse Resp BP Pulse Ox


 


 98.6 F   109   20   120/74   94 


 


 11/05/18 13:54  11/05/18 13:54  11/05/18 13:54  11/05/18 13:54  11/05/18 13:54











Vital Signs Reviewed: Yes





Diagnostics





- Vital Signs


 Vital Signs











  Temp Pulse Resp BP Pulse Ox


 


 11/05/18 14:00   137    100


 


 11/05/18 13:58   113    95


 


 11/05/18 13:57   124   120/74  95


 


 11/05/18 13:54  98.6 F  109  20  120/74  94














- Laboratory


Result Diagrams: 


 11/05/18 14:24





 11/05/18 14:24


Lab Statement: Any lab studies that have been ordered have been reviewed, and 

results considered in the medical decision making process.





- CT


  ** Brain


CT Interpretation Completed By: Radiologist - NO ACUTE INTRACRANIAL PATHOLOGY. 

DIFFUSE INVOLUTIONAL CHANGE WITH CHRONIC SMALL VESSEL ISCHEMIC CHANGES. This 

report has been reviewed by the ED physician.





- EKG


  ** 14:08


Cardiac Rate: Tachycardia


EKG Rhythm: Sinus Tachycardia - 104


Summary of EKG Findings: no STEMI, ischemic changes, ST depression in lateral 

leads.





Re-Evaluation





- Re-Evaluation


  ** First Eval


Re-Evaluation Time: 14:30


Change: Unchanged


Comment: cleared for [facility]





Course/Dx





- Course


Course Of Treatment: A 64 y/o female presents to the ED c/o social isolation 

since before 11/5/2018. Her PE revealed a large hematoma over her left side. 

Her EKG showed tachycardia and her Brain CT showed no acute intracranial 

pathology. Based on a significant drop in Hemoglobin, her INR level being 3 and 

her guaiac positive stool. Dx:GI bleed, Hematoma. She will be admitted to Dr. Rivera, hospitalist.





- Differential Dx/Clinical Impression


Provider Diagnosis: 


 GI bleed, Hematoma








- Physician Notifications


Discussed Care Of Patient With: Jason Rivera


Time Discussed With Above Provider: 18:15


Instructed by Provider To: Admit As Inpatient





Discharge





- Sign-Out/Discharge


Documenting (check all that apply): Patient Departure





- Discharge Plan


Condition: Fair


Disposition: ADMITTED TO [facility]


Referrals: 


Maria C Vaz MD [Primary Care Provider] - 





- Attestation Statements


Document Initiated by Scribe: Yes


Documenting Scribe: Pal Oconnell


Provider For Whom Scribe is Documenting (Include Credential): Ander Irizarry MD


Scribe Attestation: 


IPal, scribed for Ander Irizarry MD on 11/05/18 at 1933.

## 2018-11-06 LAB
BASOPHILS # BLD AUTO: 0 10^3/UL (ref 0–0.2)
EOSINOPHIL # BLD AUTO: 0.2 10^3/UL (ref 0–0.6)
HCT VFR BLD AUTO: 24 % (ref 35–47)
HCT VFR BLD AUTO: 24 % (ref 35–47)
HCT VFR BLD AUTO: 28 % (ref 35–47)
HCT VFR BLD AUTO: 28 % (ref 35–47)
HCT VFR BLD AUTO: 29 % (ref 35–47)
HGB BLD-MCNC: 10.1 G/DL (ref 12–16)
HGB BLD-MCNC: 8.4 G/DL (ref 12–16)
HGB BLD-MCNC: 8.4 G/DL (ref 12–16)
HGB BLD-MCNC: 9.7 G/DL (ref 12–16)
HGB BLD-MCNC: 9.8 G/DL (ref 12–16)
INR PPP/BLD: 1.52 (ref 0.77–1.02)
LYMPHOCYTES # BLD AUTO: 2.7 10^3/UL (ref 1–4.8)
MCH RBC QN AUTO: 29 PG (ref 27–31)
MCHC RBC AUTO-ENTMCNC: 35 G/DL (ref 31–36)
MCV RBC AUTO: 83 FL (ref 80–97)
MONOCYTES # BLD AUTO: 0.6 10^3/UL (ref 0–0.8)
NEUTROPHILS # BLD AUTO: 4.2 10^3/UL (ref 1.5–7.7)
NRBC # BLD AUTO: 0 10^3/UL
NRBC BLD QL AUTO: 0.1
PLATELET # BLD AUTO: 166 10^3/UL (ref 150–450)
RBC # BLD AUTO: 2.94 10^6/UL (ref 4–5.4)
WBC # BLD AUTO: 7.8 10^3/UL (ref 3.5–10.8)

## 2018-11-06 PROCEDURE — 30233K1 TRANSFUSION OF NONAUTOLOGOUS FROZEN PLASMA INTO PERIPHERAL VEIN, PERCUTANEOUS APPROACH: ICD-10-PCS | Performed by: INTERNAL MEDICINE

## 2018-11-06 PROCEDURE — 0DJ08ZZ INSPECTION OF UPPER INTESTINAL TRACT, VIA NATURAL OR ARTIFICIAL OPENING ENDOSCOPIC: ICD-10-PCS | Performed by: INTERNAL MEDICINE

## 2018-11-06 PROCEDURE — 30233N1 TRANSFUSION OF NONAUTOLOGOUS RED BLOOD CELLS INTO PERIPHERAL VEIN, PERCUTANEOUS APPROACH: ICD-10-PCS | Performed by: INTERNAL MEDICINE

## 2018-11-06 RX ADMIN — INSULIN LISPRO SCH UNITS: 100 INJECTION, SOLUTION INTRAVENOUS; SUBCUTANEOUS at 12:54

## 2018-11-06 RX ADMIN — BUPROPION HYDROCHLORIDE SCH MG: 300 TABLET, FILM COATED, EXTENDED RELEASE ORAL at 08:48

## 2018-11-06 RX ADMIN — INSULIN LISPRO SCH UNITS: 100 INJECTION, SOLUTION INTRAVENOUS; SUBCUTANEOUS at 17:14

## 2018-11-06 RX ADMIN — LAMOTRIGINE SCH MG: 100 TABLET ORAL at 21:10

## 2018-11-06 RX ADMIN — PANTOPRAZOLE SODIUM SCH MLS/HR: 40 INJECTION, POWDER, FOR SOLUTION INTRAVENOUS at 17:42

## 2018-11-06 RX ADMIN — INSULIN LISPRO SCH UNITS: 100 INJECTION, SOLUTION INTRAVENOUS; SUBCUTANEOUS at 21:10

## 2018-11-06 RX ADMIN — LAMOTRIGINE SCH MG: 100 TABLET ORAL at 08:48

## 2018-11-06 RX ADMIN — ATORVASTATIN CALCIUM SCH MG: 40 TABLET, FILM COATED ORAL at 21:10

## 2018-11-06 RX ADMIN — TRAZODONE HYDROCHLORIDE SCH MG: 100 TABLET ORAL at 01:20

## 2018-11-06 RX ADMIN — PANTOPRAZOLE SODIUM SCH MLS/HR: 40 INJECTION, POWDER, FOR SOLUTION INTRAVENOUS at 06:28

## 2018-11-06 NOTE — PRO
DATE:  11/06/18       REFERRING PHYSICIANS:   Maria C Vaz, Jose Armando Mcdermott.*

 

PROCEDURE:  Upper gastrointestinal endoscopy through to distal duodenum.

 

INDICATION:  This 63-year-old woman was admitted after a week of reporting 
black stool and having falls.  She had been on warfarin because of atrial 
fibrillation, having started Eliquis in April 2017 but switching quickly for 
cost. .  She has a pattern of nosebleeds in the winter and one last week. Her 
BUN was not elevated in the emergency room. She takes omeprazole 20 mg daily 
and also aspirin 81 mg.

 

See consult for further information.

 

ENDOSCOPIST:  Dr. Whatley         MEDICATIONS:  Midazolam 8, fentanyl 50. 



FINDINGS:  She is a moderately overweight woman, appearing somewhat anxious, 
but in no cardiorespiratory distress at this time.  She was positioned on her 
side down and incremental doses of Versed and fentanyl given until she was very 
minimally responsive to moderate voice.  She tolerated the exam quite well. 



EGD: 

Larynx - narrow and difficult to see as there were powerful tongue movements.

 

Esophagus - easily entered, the mucosa is normal in the upper, mid, and lower 
esophagus with EG junction at 35 to 36.  There has been a moderate-to-large 
hiatal hernia.  In the distal esophagus, there are no erosions, Schmid's change
, or stricture.

 

Stomach - moderate-to-large hiatal hernia, but otherwise no abnormality as the 
mucosa appears normal in the cardia, fundus, body, and antrum.  Retroflex views 
were quite good as she tolerated that well.  The antrum appeared normal.

 

Duodenum - the pylorus, bulb, and second through fourth portions were normal. 
There was no indication for a biopsy.

 

IMPRESSION:

1.  Moderate-to-large hiatal hernia.

2.  Clinical gastroesophageal reflux disease - at one of her prior visits, she 
discussed omeprazole "turning the light off" for acid peptic complaints.

3.  Black stool - source not determined with this exam and once she has been 
cleared cardiac wise, she could have an outpatient colonoscopy planned.

 

[id number]/CPS #: [id number]

DIVYA

## 2018-11-06 NOTE — ECHO
Patient:      JOHNATHAN MARTÍNEZ 

Med Rec#:     [id number]            :          1955          

Date:         2018            Age:          63y                 

Account#:     [id number]          Height:       152.4 cm / 60.0 in

Accession#:   [id number]           Weight:       63.5 kg / 140.0 lbs

Sex:          F                     BSA:          1.6

Room#:        [facility]5                 

Admit Date#:  2018          

Type:         Inpatient

 

Referring:    Uri Duarte NP

Reading:      Qutaybeh Maghaydah, MD

Sonographer:  Keesha Rodarte RDCS

CC:           Maria C Vaz MD

______________________________________________________________________

 

Transthoracic Echocardiogram

 

Indication:

Abnormal EKG

BP:           111/54

HR:           72

Rhythm:       NSR

 

Findings     

History:

A-fib, CAD, HLD, HTN, DM, former smoker.  

 

Technical Comments:

The study is technically difficult.  The study is technically limited

due to poor apical windows.  Completed at 0830. 

 

Left Ventricle:

The left ventricular chamber size is decreased. Mild concentric left

ventricular hypertrophy is observed. Global left ventricular wall motion

and contractility are within normal limits. There is normal left

ventricular systolic function. The estimated ejection fraction is

60-65%.  There is no consistent Doppler evidence of clinically

significant     diastolic dysfunction. 

 

Left Atrium:

The left atrium is mild to moderately dilated.  

 

Right Ventricle:

Moderator Band present. The right ventricular cavity size is normal. The

right ventricular global systolic function is normal. 

 

Right Atrium:

The right atrium is mild to moderately dilated.  

 

Aortic Valve:

The aortic valve is trileaflet. The aortic valve leaflets are mildly

thickened. Systolic excursion of the aortic valve cusps is reduced.

There is no evidence of aortic regurgitation. There is mild aortic

stenosis. The mean gradient of the aortic valve is 11.66 mmHg.  The peak

instantaneous gradient of the aortic valve is 25.05 mmHg.  The aortic

valve area, by peak velocities, is calculated at 1.1 cm2.  The aortic

valve area, by VTI's, is calculated at 1.5 cm2.  

 

Mitral Valve:

The mitral valve leaflets are mildly thickened. There is a trace of

mitral regurgitation. There is no evidence of mitral stenosis. 

 

Tricuspid Valve:

The tricuspid valve leaflets are normal.  There is trace tricuspid

regurgitation.  Unable to estimate the right ventricular systolic

pressure.   There is no tricuspid stenosis. 

 

Pulmonic Valve:

The pulmonic valve appears normal. There is a trace pulmonic

regurgitation.  There is no pulmonic stenosis.  

 

Pericardium:

There is no significant pericardial effusion. A pericardial fat pad is

visualized. 

 

Aorta:

There is mild dilatation of the ascending aorta. The aortic arch is not

well visualized.  The aortic root is normal in size. 

 

Pulmonary Artery:

The main pulmonary artery appears normal. 

 

Venous:

The inferior vena cava appears normal in size. There is a greater than

50% respiratory change in the inferior vena cava dimension. 

 

Contrast:

Definity was used to optimize study.  2 mL of diluted Definity were

utilized.  Intravenous contrast was used to enhance endocardial border

definition. 

 

Summary:

There was not any prior study for comparison. 

 

Conclusions

The left ventricular chamber size is decreased.

Mild concentric left ventricular hypertrophy is observed.

The estimated ejection fraction is 60-65%. 

There is no consistent Doppler evidence of clinically significant    

diastolic dysfunction.

The left atrium is mild to moderately dilated. 

There is mild aortic stenosis.

The mean gradient of the aortic valve is 11.66 mmHg. 

There is a trace of mitral regurgitation.

There is trace tricuspid regurgitation. 

Unable to estimate the right ventricular systolic pressure.  

There is a trace pulmonic regurgitation. 

There is mild dilatation of the ascending aorta.

 

Measurements     

Name                    Value         Normal Range            

RVIDd (AP) 2D           2.8 cm        (0.9 - 2.6)             

RVDdMajor (2D)          3.7 cm        (2.2 - 4.4)             

RAd ISD 4CH             5.4 cm        (3.4 - 4.9)             

RA (A4C)W               3.8 cm        (2.9 - 4.6)             

IVSd (2D)               1.2 cm        (0.6 - 1)               

LVPWd (2D)              1.1 cm        (0.6 - 1)               

LVIDd (2D)              3.5 cm        (3.6 - 5.4)             

LVIDs (2D)              2.4 cm        -                        

LV FS (2D)              33 %          (25 - 45)               

Aortic Annulus          1.8 cm        (1.4 - 2.6)             

Ao root diameter (2D)   2.6 cm        (2.1 - 3.5)             

Ascending Ao            3.5 cm        (2.1 - 3.4)             

LA dimension (AP) 2D    3.9 cm        (2.3 - 3.8)             

LAd ISD 4CH             4.6 cm        (2.9 - 5.3)             

LA ISD 4CH W            4.2 cm        (2.5 - 4.5)             

 

Name                    Value         Normal Range            

LA ESV SP 4CH (A/L)     56 ml         -                        

LA ESV SP 2CH (A/L)     78 ml         -                        

LA ESV BP (A/L)         69 ml         -                        

LA ESV BP (A/L) index   43 ml/m2      -                        

LA ESV SP 4CH (MOD)     52 ml         -                        

LA ESV SP 2CH (MOD)     73 ml         -                        

 

Name                    Value         Normal Range            

MV E-wave Vmax          0.75 m/sec    -                        

MV deceleration time    261.7 msec    -                        

MV A-wave Vmax          0.91 m/sec    -                        

MV E:A ratio            0.83 ratio    -                        

LV septal e' Vmax       0.08 m/sec    -                        

LV lateral e' Vmax      0.08 m/sec    -                        

LV E:e' septal ratio    9.37 ratio    -                        

LV E:e' lateral ratio   9.37 ratio    -                        

 

Name                    Value         Normal Range            

AV Vmax                 2.5 m/sec     -                        

AV VTI                  42.3 cm       -                        

AV peak gradient        25.05 mmHg    -                        

AV mean gradient        11.66 mmHg    -                        

LVOT diameter           2 cm          -                        

LVOT Vmax               0.87 m/sec    -                        

LVOT VTI                19.79 cm      -                        

LVOT peak gradient      3.05 mmHg     -                        

LVOT mean gradient      1.59 mmHg     -                        

DOI (VTI)               0.46 ratio    -                        

CHAYITO (continuity Vmax)   1.1 cm2       -                        

CHAYITO (continuity VTI)    1.5 cm2       -                        

 

Name                    Value         Normal Range            

IVC diameter            1.8 cm        -                        

 

Name                    Value         Normal Range            

PV Vmax                 1 m/sec       -                        

PV peak gradient        4.06 mmHg     -                        

 

Electronically signed by: Qutaybeh Maghaydah, MD on 2018 13:14:22

## 2018-11-06 NOTE — PN
Subjective


Date of Service: 11/06/18


Interval History: 








Pt reports she is feeling "much much better". She reports no stool since Sunday 

reporting she was having dark tarry stool for 1 week 3-4 a day. She denies CP 

but does report yesterday she was "very very SOB". Denies abdominal pain. 

Dizziness has resolved. 





Pt reports she doesnt leave the house or "do anything but lay in the couch with 

my dog". She only leaves for dog food. She reports hx of frequent falls but 

this increased over the last week having multiple falls a day. Reports "big 

bruise" on left abdomen" other wise denies any trauma














Objective


Active Medications: 








Acetaminophen (Tylenol Tab*)  650 mg PO Q4H PRN


   PRN Reason: FEVER/PAIN


Atorvastatin Calcium (Lipitor*)  40 mg PO BEDTIME Pending sale to [facility]


   Last Admin: 11/05/18 22:56 Dose:  40 mg


Bupropion HCl (Bupropion Xl*)  300 mg PO DAILY Pending sale to [facility]


   Last Admin: 11/06/18 08:48 Dose:  300 mg


Pantoprazole Sodium (Protonix Iv Bag*)  80 mg in 250 mls @ 25 mls/hr IVPB Q10H 

Pending sale to [facility]


   Last Admin: 11/06/18 06:28 Dose:  25 mls/hr


Potassium Chloride (Potassium Chloride 20 Meq/100 Ml Ivpremix*)  20 meq in 100 

mls @ 50 mls/hr IV ONCE ONE


   Stop: 11/06/18 11:53


Lamotrigine (Lamictal Tab(*))  200 mg PO BID Pending sale to [facility]


   Last Admin: 11/06/18 08:48 Dose:  200 mg


Ondansetron HCl (Zofran Inj*)  4 mg IV Q6H PRN


   PRN Reason: NAUSEA


Trazodone HCl (Desyrel Tab*)  100 mg PO BEDTIME Pending sale to [facility]


   Last Admin: 11/06/18 01:20 Dose:  100 mg








 Vital Signs - 8 hr











  11/06/18 11/06/18 11/06/18





  08:30 08:45 09:00


 


Temperature   


 


Pulse Rate 68 80 


 


Respiratory 19 21 18





Rate   


 


Blood Pressure 137/52 137/54 





(mmHg)   


 


O2 Sat by Pulse 95 93 





Oximetry   














  11/06/18





  09:15


 


Temperature 


 


Pulse Rate 80


 


Respiratory 18





Rate 


 


Blood Pressure 127/57





(mmHg) 


 


O2 Sat by Pulse 96





Oximetry 











Oxygen Devices in Use Now: None


Appearance: 64 yo female laying in bed A+O x3 in NAD


Eyes: No Scleral Icterus, PERRLA


Ears/Nose/Mouth/Throat: NL Teeth, Lips, Gums, Mucous Membranes Moist


Neck: NL Appearance and Movements; NL JVP


Respiratory: Symmetrical Chest Expansion and Respiratory Effort, Clear to 

Auscultation


Cardiovascular: NL Sounds; No Murmurs; No JVD, RRR, No Edema


Abdominal: NL Sounds; No Tenderness; No Distention, - - large ecchymosis on 

left abdomen/flank


Extremities: No Edema, No Clubbing, Cyanosis


Skin: No Rash or Ulcers, No Nodules or Sclerosis


Neurological: Alert and Oriented x 3, NL Sensation, NL Muscle Strength and Tone


Lines/Tubes/Other Access: Clean, Dry and Intact Peripheral IV


Nutrition: - - NPO


Result Diagrams: 


 11/06/18 05:10





 11/06/18 05:10


Microbiology and Other Data: 


 Microbiology











 11/05/18 23:00 Nasal Screen MRSA (PCR) - Final





 Nasal    Mrsa Not Detected


 


 11/05/18 17:06 Stool Occult Blood (CHRISTIANO) - Final





 Stool 














Assess/Plan/Problems-Billing


Assessment: Mrs. Bran is a 64 yo female with PMH of afib on coumadin, bipolar

, CAD, HLD, HTN, DM, anxiety, depression and severe agoraphobia who presented 

to the ER on 11/5 with c/o of increasing falls over the last week, not feeling 

well with fatigue and dark tarry stools foun dto have anemia and positive heme 

stool











- Patient Problems


(1) GI bleed


Comment: 


- Stable. Dizziness resolved. Pt reports last melena stool was 2 days ago, 

reporting she had 1 week of 3-4 melena stools at home prior to coming in. 


- on coumadin - given Vitamin K & FFP on admission


- hgb 8 on admission down from 14 in 3/2018 - Hgb still around 8 after given 1 

unit PRBCs and 2 units FFP - plan to give another 1 units PRBCs with hx of CAD 

and EKG changes


- heme + stool 


- hemodynamically stable 


- Patient will require an endoscopy however plan to obtain nuclear stress test 

first. if low risk could go for endoscopy tomorrow. 


- Continue protonix gtt


- GI consult pending


    





(2) Abnormal EKG


Comment: 


- Asymptomatic


- Diffuse ST depression - repeat EKG this morning improving but still noted ST 

depression. Pt is asymptomatic with no reported CP currently or previously at 

her baseline. She did report that she was VERY SOB yesterday - hard to 

distinguish between anemia or possible cardiac. DIscussed with Cardiology over 

the phone who recommend nuclear stress test which will be scheduled for 

tomorrow. 


- 3 troponins negative 


- Echo pending   





(3) CAD (coronary artery disease)


Comment: 


- s/p stent placement


- Hold ASA in the setting of GI bleed


- restart metoprolol with hold parameters   





(4) Anxiety and depression


Comment: 


Severe agoraphobia with reported history of bipolar disorder


Continue trazodone, wellbutrin, atarax, lamictal.    





(5) HTN (hypertension)


Comment: 


Continue BB with hold parameter


Hold home medications norvasc, lisinopril   





(6) Type II diabetes mellitus


Comment: 


HgbA1c is good at 6.4% in 3/2018


Hold metformin


FSBG q4hr with lispro SS while NPO - once she starts eating consider adding 

basal   





(7) Afib


Comment: 


- In sinus. Continue BB. Hold asa and coumadin in [place] setting of GI bleed   





(8) Falls


Comment: 


- Increase in falls the last week thoughtto be 2nd to anemia however pt reports 

frequent falls ather baseline. PT should eval after patient is stable prior to 

going home.    





(9) Full code status








(10) DVT prophylaxis


Comment: 


SCDs in the setting of GI bleed   


Status and Disposition: 





inpatient with GI bleed also found to have diffuse ST depression.

## 2018-11-07 LAB
BASOPHILS # BLD AUTO: 0 10^3/UL (ref 0–0.2)
EOSINOPHIL # BLD AUTO: 0.3 10^3/UL (ref 0–0.6)
HCT VFR BLD AUTO: 27 % (ref 35–47)
HGB BLD-MCNC: 9.4 G/DL (ref 12–16)
LYMPHOCYTES # BLD AUTO: 2.1 10^3/UL (ref 1–4.8)
MCH RBC QN AUTO: 29 PG (ref 27–31)
MCHC RBC AUTO-ENTMCNC: 34 G/DL (ref 31–36)
MCV RBC AUTO: 86 FL (ref 80–97)
MONOCYTES # BLD AUTO: 0.5 10^3/UL (ref 0–0.8)
NEUTROPHILS # BLD AUTO: 5 10^3/UL (ref 1.5–7.7)
NRBC # BLD AUTO: 0 10^3/UL
NRBC BLD QL AUTO: 0.1
PLATELET # BLD AUTO: 156 10^3/UL (ref 150–450)
RBC # BLD AUTO: 3.2 10^6/UL (ref 4–5.4)
WBC # BLD AUTO: 8 10^3/UL (ref 3.5–10.8)

## 2018-11-07 RX ADMIN — PANTOPRAZOLE SODIUM SCH: 40 INJECTION, POWDER, FOR SOLUTION INTRAVENOUS at 05:37

## 2018-11-07 RX ADMIN — INSULIN LISPRO SCH UNITS: 100 INJECTION, SOLUTION INTRAVENOUS; SUBCUTANEOUS at 15:12

## 2018-11-07 RX ADMIN — TRAZODONE HYDROCHLORIDE SCH MG: 100 TABLET ORAL at 20:17

## 2018-11-07 RX ADMIN — INSULIN LISPRO SCH: 100 INJECTION, SOLUTION INTRAVENOUS; SUBCUTANEOUS at 09:25

## 2018-11-07 RX ADMIN — BUPROPION HYDROCHLORIDE SCH MG: 300 TABLET, FILM COATED, EXTENDED RELEASE ORAL at 09:32

## 2018-11-07 RX ADMIN — ATORVASTATIN CALCIUM SCH MG: 40 TABLET, FILM COATED ORAL at 20:17

## 2018-11-07 RX ADMIN — METOPROLOL TARTRATE SCH MG: 25 TABLET, FILM COATED ORAL at 12:32

## 2018-11-07 RX ADMIN — LAMOTRIGINE SCH MG: 100 TABLET ORAL at 20:17

## 2018-11-07 RX ADMIN — INSULIN LISPRO SCH UNITS: 100 INJECTION, SOLUTION INTRAVENOUS; SUBCUTANEOUS at 21:20

## 2018-11-07 RX ADMIN — TRAZODONE HYDROCHLORIDE SCH: 100 TABLET ORAL at 01:20

## 2018-11-07 RX ADMIN — LAMOTRIGINE SCH MG: 100 TABLET ORAL at 09:32

## 2018-11-07 RX ADMIN — INSULIN LISPRO SCH: 100 INJECTION, SOLUTION INTRAVENOUS; SUBCUTANEOUS at 02:42

## 2018-11-08 VITALS — DIASTOLIC BLOOD PRESSURE: 59 MMHG | SYSTOLIC BLOOD PRESSURE: 135 MMHG

## 2018-11-08 RX ADMIN — INSULIN LISPRO SCH: 100 INJECTION, SOLUTION INTRAVENOUS; SUBCUTANEOUS at 15:12

## 2018-11-08 RX ADMIN — METOPROLOL TARTRATE SCH MG: 25 TABLET, FILM COATED ORAL at 09:15

## 2018-11-08 RX ADMIN — INSULIN LISPRO SCH UNITS: 100 INJECTION, SOLUTION INTRAVENOUS; SUBCUTANEOUS at 02:44

## 2018-11-08 RX ADMIN — BUPROPION HYDROCHLORIDE SCH MG: 300 TABLET, FILM COATED, EXTENDED RELEASE ORAL at 09:14

## 2018-11-08 RX ADMIN — INSULIN LISPRO SCH UNITS: 100 INJECTION, SOLUTION INTRAVENOUS; SUBCUTANEOUS at 09:15

## 2018-11-08 RX ADMIN — LAMOTRIGINE SCH MG: 100 TABLET ORAL at 09:14

## 2018-11-08 NOTE — DS
CC:  Dr. Whatley*

 

DATE OF ADMISSION:  11/05/2018.

 

DATE OF DISCHARGE:  11/08/2018.

 

PRIMARY CARE PHYSICIAN:  Dr. Vaz.

 

MY ATTENDING PHYSICIAN TODAY:  Dr. Jason Rivera * (dictated by Missy Coleman NP).

 

HOSPITAL COURSE:  This is a pleasant, 63-year-old female patient who presented 
to the emergency department on 

11/05/2018 with a complaint of dizziness, falls, and general malaise.  She was 
found to have significant anemia.  She had a positive Hemoccult at that time 
and she was admitted for GI bleeding.  She was seen by Dr. Whatley.  She was at 
that time hemodynamically stable.  She was mildly tachycardic, but with normal 
blood pressure.  She was given one unit of blood, two units of FFP, and vitamin 
K to reverse her INR.  Serial H and H's improved post transfusion.  She was 
also placed on Protonix drip.  The patient underwent an endoscopy with Dr. Whatley which did not show any acute bleeding.  At that point, Dr. Whatley 
recommended that she could have an outpatient colonoscopy.  Her hemoglobin and 
hematocrit stayed stable postprocedure.

 

She did have some EKG changes, likely was related to profound anemia and 
tachycardia.  She did undergo a nuclear stress test on the 7th of November. 
Nuclear stress test at that time showed no fixed or reversible perfusion defects
, ejection fraction is 76 percent, and overall a low risk rating for her 
nuclear stress test.  The patient still had some significant gait dysfunction.  
She did report frequent falls and general debility leading up to these events 
and her admission to the hospital.  She had physical therapy and occupational 
therapy evaluations to determine if the patient would benefit from short-term 
rehab.  The patient was stabilized for discharge on 11/08/2018 and was accepted 
at [facility].

 

REVIEW OF SYSTEMS THE DAY OF DISCHARGE: Ten point review of systems is negative
, except as noted in the HPI.

 

PHYSICAL EXAMINATION:  General:  The patient is awake, alert, and in no acute 
distress.  Vital Signs:  Blood pressure 128/65, heart rate 73, respiratory rate 
18, O2 saturation 98 percent on room air with a temperature of 98.7.  HEENT:  
The patient is atraumatic, normocephalic.  PERRLA with nonicteric sclerae.  Neck
: Supple, nontender.  No JVD noted.  No carotid bruit auscultated.  
Cardiovascular: S1, S2 present.  Rate is irregular.  No murmurs, gallops, or 
rubs noted.  Lungs: Clear bilaterally to auscultation with no wheezing, rhonchi
, or rales.  Abdomen: Soft, nontender, nondistended.  Positive bowel sounds all 
four quadrants.  : Deferred.  Musculoskeletal:  There is no clubbing, no 
cyanosis, no edema.  Gross motor and sensation are intact.  She is generally 
weak.  Skin assessment:  She does have some ecchymosis and bruising on her left 
abdomen and rib cage secondary to a fall prior to her arrival.  This does  not 
appear to have worsened.  Neurologic: She is grossly intact with no focalities. 
Psychiatric:  She is cooperative and appropriate.

 

LABORATORY DATA:  Dated 11/07/2018, WBC 8.0, RBC 3.20, hemoglobin 9.4, 
hematocrit 27, platelets 156.  INR at admission was 3.31, down to 1.52 after 
admission, vitamin K and FFP.  Sodium 137, potassium 3.7, chloride 108, CO2 21, 
BUN 9, creatinine 0.60, .3, glucose 135, calcium 8.3.

 

IMAGING:  Stress test as noted above.  CAT scan of the brain dated 11/05/2018 
shows no acute intracranial pathology with diffuse involutional changes an 
chronic small vessel ischemic changes.

 

Transthoracic echo dated 11/05/2018 shows left ventricular chamber size is 
decreased, mild concentric left ventricular hypertrophy is observed, estimated 
EF is 60 to 65 percent, no consistent Doppler evidence of diastolic dysfunction
, left atrium is mildly dilated, there is mild aortic stenosis, trace of mitral 
regurgitation, trace tricuspid regurgitation, mild dilatation of the ascending 
aorta.

 

DISCHARGE DIAGNOSES:

1.  Symptomatic anemia, etiology likely GI bleeding and previous report of 
epistaxis, currently resolving.

2.  History of gait dysfunction and fall.

3.  History of atrial fibrillation, currently in regular sinus rhythm.

4.  History of hypertension, currently stable.

5.  History of diabetes mellitus type 2 with controlled sugars.

6.  History of behavioral disorder with a currently stable mood.

 

DISCHARGE MEDICATIONS:

1.  Lisinopril 5 mg at bedtime.

2.  Bupropion  mg p.o. daily.

3.  Metformin 1,000 mg p.o. b.i.d.

4.  Lamotrigine 200 mg p.o. b.i.d.

5.  Atarax 50 mg p.o. b.i.d. prn.

6.  Atorvastatin 40 mg in the evening.

7.  Amlodipine 10 mg daily.

8.  Baby aspirin 81 mg daily.

9.  Trazodone 100 mg at bedtime.

10. Omeprazole 20 mg daily.

11. Metoprolol Tartrate 25 mg daily.

12. Coumadin 2 mg in the evening.

13. Tylenol 650 mg p.o. q.4 hours as needed.

 

DISPOSITION:  The patient will be discharged to [facility] for 
short-term rehabilitation needs.

 

DIET:  Heart-healthy as tolerated.

 

ACTIVITY:  As tolerated.

 

FOLLOW-UP:  The patient should follow-up with her primary care provider who is 
Dr. Maria C Vaz shortly after discharge from rehab.  I also explained to the 
patient that she should discuss with Dr. Vaz or Cardiology regarding 
anticoagulation for her atrial fibrillation.  If her anemia continues, it 
appears that some of it may have been from chronic disease; however, she did 
report over bleeding and did have a Heme positive stool.  She has no longer had 
any epistaxis, so we feel comfortable restarting her Coumadin to prevent clot 
formation; however, the patient may benefit from Eliquis or another NOAC  in 
light of her complications with bleeding.  The patient stated that initially it 
was her understanding that her insurance did not cover this medication.  I 
think this is something that can be explored as an outpatient.  In the meantime
, she should be restarted on her Coumadin and her INR's monitored for 
therapeutic range.  The patient was discharged in stable condition. All 
questions were answered.  The patient stated her understanding of her discharge 
instructions, follow-ups, and medications at the time of discharge.



Time Spent: 35 minutes on discharge coordination and planning.

 

____________________________________ MISSY COLEMAN NP

 

[id number]/CPS #: [id number]

DIVYA